# Patient Record
Sex: MALE | Race: WHITE | Employment: FULL TIME
[De-identification: names, ages, dates, MRNs, and addresses within clinical notes are randomized per-mention and may not be internally consistent; named-entity substitution may affect disease eponyms.]

---

## 2017-02-03 LAB
CHOLESTEROL, TOTAL: 220 MG/DL
CHOLESTEROL/HDL RATIO: 4.2
HDLC SERPL-MCNC: 52 MG/DL (ref 35–70)
LDL CHOLESTEROL CALCULATED: 156 MG/DL (ref 0–160)
TRIGL SERPL-MCNC: 59 MG/DL
VLDLC SERPL CALC-MCNC: 12 MG/DL

## 2017-02-22 ENCOUNTER — OFFICE VISIT (OUTPATIENT)
Dept: FAMILY MEDICINE CLINIC | Facility: CLINIC | Age: 42
End: 2017-02-22

## 2017-02-22 VITALS
SYSTOLIC BLOOD PRESSURE: 118 MMHG | WEIGHT: 183.8 LBS | HEIGHT: 68 IN | BODY MASS INDEX: 27.86 KG/M2 | HEART RATE: 72 BPM | DIASTOLIC BLOOD PRESSURE: 74 MMHG

## 2017-02-22 DIAGNOSIS — R07.9 CHEST PAIN, UNSPECIFIED TYPE: Primary | ICD-10-CM

## 2017-02-22 PROCEDURE — 93000 ELECTROCARDIOGRAM COMPLETE: CPT | Performed by: FAMILY MEDICINE

## 2017-02-22 PROCEDURE — 99213 OFFICE O/P EST LOW 20 MIN: CPT | Performed by: FAMILY MEDICINE

## 2017-02-22 ASSESSMENT — ENCOUNTER SYMPTOMS
BLOOD IN STOOL: 0
CONSTIPATION: 0
BACK PAIN: 0
ABDOMINAL PAIN: 0
WHEEZING: 0
SHORTNESS OF BREATH: 0
COUGH: 0
DIARRHEA: 0
CHEST TIGHTNESS: 1

## 2017-03-10 ENCOUNTER — OFFICE VISIT (OUTPATIENT)
Dept: FAMILY MEDICINE CLINIC | Facility: CLINIC | Age: 42
End: 2017-03-10

## 2017-03-10 VITALS
HEIGHT: 69 IN | OXYGEN SATURATION: 98 % | WEIGHT: 180 LBS | SYSTOLIC BLOOD PRESSURE: 122 MMHG | TEMPERATURE: 97.8 F | DIASTOLIC BLOOD PRESSURE: 70 MMHG | HEART RATE: 94 BPM | BODY MASS INDEX: 26.66 KG/M2

## 2017-03-10 DIAGNOSIS — J40 BRONCHITIS: Primary | ICD-10-CM

## 2017-03-10 PROCEDURE — 99213 OFFICE O/P EST LOW 20 MIN: CPT | Performed by: FAMILY MEDICINE

## 2017-03-10 RX ORDER — GUAIFENESIN AND CODEINE PHOSPHATE 100; 10 MG/5ML; MG/5ML
5 SOLUTION ORAL EVERY 4 HOURS PRN
Qty: 120 ML | Refills: 0 | Status: SHIPPED | OUTPATIENT
Start: 2017-03-10 | End: 2017-05-16 | Stop reason: ALTCHOICE

## 2017-03-10 ASSESSMENT — ENCOUNTER SYMPTOMS
SINUS PRESSURE: 1
SHORTNESS OF BREATH: 0
WHEEZING: 1
GASTROINTESTINAL NEGATIVE: 1
SORE THROAT: 1
COUGH: 1
RHINORRHEA: 1

## 2017-05-16 ENCOUNTER — OFFICE VISIT (OUTPATIENT)
Dept: FAMILY MEDICINE CLINIC | Age: 42
End: 2017-05-16
Payer: COMMERCIAL

## 2017-05-16 VITALS
BODY MASS INDEX: 27.79 KG/M2 | SYSTOLIC BLOOD PRESSURE: 120 MMHG | DIASTOLIC BLOOD PRESSURE: 70 MMHG | HEART RATE: 84 BPM | WEIGHT: 186 LBS

## 2017-05-16 DIAGNOSIS — J03.90 EXUDATIVE TONSILLITIS: Primary | ICD-10-CM

## 2017-05-16 PROCEDURE — 99213 OFFICE O/P EST LOW 20 MIN: CPT | Performed by: FAMILY MEDICINE

## 2017-05-16 RX ORDER — AZITHROMYCIN 250 MG/1
TABLET, FILM COATED ORAL
Qty: 1 PACKET | Refills: 0 | Status: SHIPPED | OUTPATIENT
Start: 2017-05-16 | End: 2018-05-18 | Stop reason: ALTCHOICE

## 2017-05-16 ASSESSMENT — ENCOUNTER SYMPTOMS
BACK PAIN: 0
SHORTNESS OF BREATH: 0
DIARRHEA: 0
BLOOD IN STOOL: 0
SORE THROAT: 1
WHEEZING: 0
CONSTIPATION: 0
COUGH: 0
ABDOMINAL PAIN: 0

## 2018-04-28 LAB
ALBUMIN SERPL-MCNC: NORMAL G/DL
ALP BLD-CCNC: NORMAL U/L
ALT SERPL-CCNC: NORMAL U/L
ANION GAP SERPL CALCULATED.3IONS-SCNC: NORMAL MMOL/L
AST SERPL-CCNC: NORMAL U/L
BASOPHILS ABSOLUTE: 50 /ΜL
BASOPHILS RELATIVE PERCENT: 1.1 %
BILIRUB SERPL-MCNC: NORMAL MG/DL (ref 0.1–1.4)
BUN BLDV-MCNC: NORMAL MG/DL
CALCIUM SERPL-MCNC: NORMAL MG/DL
CHLORIDE BLD-SCNC: NORMAL MMOL/L
CHOLESTEROL, TOTAL: 258 MG/DL
CHOLESTEROL/HDL RATIO: 4.6
CO2: NORMAL MMOL/L
CREAT SERPL-MCNC: NORMAL MG/DL
EOSINOPHILS ABSOLUTE: 180 /ΜL
EOSINOPHILS RELATIVE PERCENT: 4 %
GFR CALCULATED: NORMAL
GLUCOSE BLD-MCNC: NORMAL MG/DL
HCT VFR BLD CALC: 44.9 % (ref 41–53)
HDLC SERPL-MCNC: 56 MG/DL (ref 35–70)
HEMOGLOBIN: 15.7 G/DL (ref 13.5–17.5)
LDL CHOLESTEROL CALCULATED: 176 MG/DL (ref 0–160)
LYMPHOCYTES ABSOLUTE: 1827 /ΜL
LYMPHOCYTES RELATIVE PERCENT: 40.6 %
MCH RBC QN AUTO: 30.5 PG
MCHC RBC AUTO-ENTMCNC: 12.6 G/DL
MCV RBC AUTO: 87.2 FL
MONOCYTES ABSOLUTE: 387 /ΜL
MONOCYTES RELATIVE PERCENT: 8.6 %
NEUTROPHILS ABSOLUTE: 2057 /ΜL
NEUTROPHILS RELATIVE PERCENT: 45.7 %
PLATELET # BLD: 219 K/ΜL
PMV BLD AUTO: 10.8 FL
POTASSIUM SERPL-SCNC: NORMAL MMOL/L
RBC # BLD: 5.15 10^6/ΜL
SODIUM BLD-SCNC: NORMAL MMOL/L
TOTAL PROTEIN: NORMAL
TRIGL SERPL-MCNC: 129 MG/DL
VLDLC SERPL CALC-MCNC: ABNORMAL MG/DL
WBC # BLD: 4.5 10^3/ML

## 2018-05-18 ENCOUNTER — OFFICE VISIT (OUTPATIENT)
Dept: FAMILY MEDICINE CLINIC | Age: 43
End: 2018-05-18
Payer: COMMERCIAL

## 2018-05-18 VITALS
BODY MASS INDEX: 26.66 KG/M2 | SYSTOLIC BLOOD PRESSURE: 128 MMHG | HEIGHT: 69 IN | DIASTOLIC BLOOD PRESSURE: 62 MMHG | HEART RATE: 82 BPM | TEMPERATURE: 98 F | WEIGHT: 180 LBS

## 2018-05-18 DIAGNOSIS — J02.9 SORE THROAT: Primary | ICD-10-CM

## 2018-05-18 DIAGNOSIS — R05.9 COUGH: ICD-10-CM

## 2018-05-18 LAB — S PYO AG THROAT QL: NORMAL

## 2018-05-18 PROCEDURE — 87880 STREP A ASSAY W/OPTIC: CPT | Performed by: FAMILY MEDICINE

## 2018-05-18 PROCEDURE — 99213 OFFICE O/P EST LOW 20 MIN: CPT | Performed by: FAMILY MEDICINE

## 2018-05-18 ASSESSMENT — PATIENT HEALTH QUESTIONNAIRE - PHQ9
SUM OF ALL RESPONSES TO PHQ9 QUESTIONS 1 & 2: 0
SUM OF ALL RESPONSES TO PHQ QUESTIONS 1-9: 0
2. FEELING DOWN, DEPRESSED OR HOPELESS: 0
1. LITTLE INTEREST OR PLEASURE IN DOING THINGS: 0

## 2018-05-18 ASSESSMENT — ENCOUNTER SYMPTOMS
CONSTIPATION: 0
ABDOMINAL PAIN: 0
COUGH: 1
BACK PAIN: 0
SORE THROAT: 1
DIARRHEA: 0
SHORTNESS OF BREATH: 0
WHEEZING: 0
BLOOD IN STOOL: 0

## 2018-12-05 ENCOUNTER — TELEPHONE (OUTPATIENT)
Dept: FAMILY MEDICINE CLINIC | Age: 43
End: 2018-12-05

## 2018-12-05 DIAGNOSIS — E78.5 DYSLIPIDEMIA: Primary | ICD-10-CM

## 2018-12-11 ENCOUNTER — HOSPITAL ENCOUNTER (OUTPATIENT)
Age: 43
Setting detail: SPECIMEN
Discharge: HOME | End: 2018-12-11
Payer: COMMERCIAL

## 2018-12-11 DIAGNOSIS — E78.5 DYSLIPIDEMIA: ICD-10-CM

## 2018-12-11 LAB
ALBUMIN SERPL-MCNC: 4.8 G/DL (ref 3.5–5.2)
ALBUMIN/GLOBULIN RATIO: 1.8 (ref 1–2.5)
ALP BLD-CCNC: 67 U/L (ref 40–129)
ALT SERPL-CCNC: 17 U/L (ref 5–41)
ANION GAP SERPL CALCULATED.3IONS-SCNC: 14 MMOL/L (ref 9–17)
AST SERPL-CCNC: 20 U/L
BILIRUB SERPL-MCNC: 0.67 MG/DL (ref 0.3–1.2)
BUN BLDV-MCNC: 23 MG/DL (ref 6–20)
BUN/CREAT BLD: ABNORMAL (ref 9–20)
CALCIUM SERPL-MCNC: 9.8 MG/DL (ref 8.6–10.4)
CHLORIDE BLD-SCNC: 103 MMOL/L (ref 98–107)
CHOLESTEROL/HDL RATIO: 4.3
CHOLESTEROL: 256 MG/DL
CO2: 26 MMOL/L (ref 20–31)
CREAT SERPL-MCNC: 0.98 MG/DL (ref 0.7–1.2)
GFR AFRICAN AMERICAN: >60 ML/MIN
GFR NON-AFRICAN AMERICAN: >60 ML/MIN
GFR SERPL CREATININE-BSD FRML MDRD: ABNORMAL ML/MIN/{1.73_M2}
GFR SERPL CREATININE-BSD FRML MDRD: ABNORMAL ML/MIN/{1.73_M2}
GLUCOSE BLD-MCNC: 91 MG/DL (ref 70–99)
HCT VFR BLD CALC: 48.8 % (ref 40.7–50.3)
HDLC SERPL-MCNC: 60 MG/DL
HEMOGLOBIN: 15.9 G/DL (ref 13–17)
LDL CHOLESTEROL: 179 MG/DL (ref 0–130)
MCH RBC QN AUTO: 29.8 PG (ref 25.2–33.5)
MCHC RBC AUTO-ENTMCNC: 32.6 G/DL (ref 28.4–34.8)
MCV RBC AUTO: 91.6 FL (ref 82.6–102.9)
NRBC AUTOMATED: 0 PER 100 WBC
PDW BLD-RTO: 12.3 % (ref 11.8–14.4)
PLATELET # BLD: 256 K/UL (ref 138–453)
PMV BLD AUTO: 10.8 FL (ref 8.1–13.5)
POTASSIUM SERPL-SCNC: 4.2 MMOL/L (ref 3.7–5.3)
RBC # BLD: 5.33 M/UL (ref 4.21–5.77)
SODIUM BLD-SCNC: 143 MMOL/L (ref 135–144)
TOTAL PROTEIN: 7.5 G/DL (ref 6.4–8.3)
TRIGL SERPL-MCNC: 86 MG/DL
VLDLC SERPL CALC-MCNC: ABNORMAL MG/DL (ref 1–30)
WBC # BLD: 5.7 K/UL (ref 3.5–11.3)

## 2018-12-17 ENCOUNTER — OFFICE VISIT (OUTPATIENT)
Dept: FAMILY MEDICINE CLINIC | Age: 43
End: 2018-12-17
Payer: COMMERCIAL

## 2018-12-17 VITALS
WEIGHT: 182 LBS | HEART RATE: 70 BPM | SYSTOLIC BLOOD PRESSURE: 126 MMHG | BODY MASS INDEX: 26.96 KG/M2 | DIASTOLIC BLOOD PRESSURE: 80 MMHG | HEIGHT: 69 IN

## 2018-12-17 DIAGNOSIS — E78.5 DYSLIPIDEMIA: ICD-10-CM

## 2018-12-17 DIAGNOSIS — R19.5 HEME POSITIVE STOOL: ICD-10-CM

## 2018-12-17 DIAGNOSIS — Z00.00 PHYSICAL EXAM, ANNUAL: Primary | ICD-10-CM

## 2018-12-17 PROCEDURE — 99396 PREV VISIT EST AGE 40-64: CPT | Performed by: FAMILY MEDICINE

## 2018-12-17 ASSESSMENT — ENCOUNTER SYMPTOMS
CONSTIPATION: 0
COUGH: 0
ABDOMINAL PAIN: 0
BLOOD IN STOOL: 0
SHORTNESS OF BREATH: 0
ANAL BLEEDING: 1
WHEEZING: 0
BACK PAIN: 0
DIARRHEA: 0

## 2018-12-17 NOTE — PROGRESS NOTES
Allergen Reactions    Lodine [Etodolac]     Pcn [Penicillins]          Subjective:   Review of Systems   Constitutional: Negative for chills, diaphoresis, fatigue and fever. HENT: Negative for congestion and hearing loss. Eyes: Negative for visual disturbance. Respiratory: Negative for cough, shortness of breath and wheezing. Cardiovascular: Negative for chest pain, palpitations and leg swelling. Gastrointestinal: Positive for anal bleeding. Negative for abdominal pain, blood in stool, constipation and diarrhea. Genitourinary: Negative for dysuria. Musculoskeletal: Negative for arthralgias, back pain, gait problem and neck pain. Skin: Negative for rash. Neurological: Negative for weakness, numbness and headaches. Psychiatric/Behavioral: Negative for dysphoric mood and sleep disturbance. Objective:   /80   Pulse 70   Ht 5' 9\" (1.753 m)   Wt 182 lb (82.6 kg)   BMI 26.88 kg/m²     Physical Exam   Constitutional: He is oriented to person, place, and time. He appears well-developed and well-nourished. No distress. HENT:   Head: Normocephalic and atraumatic. Mouth/Throat: No oropharyngeal exudate. Eyes: Right eye exhibits no discharge. Left eye exhibits no discharge. No scleral icterus. Neck: Neck supple. Carotid bruit is not present. No thyromegaly present. Cardiovascular: Normal rate, regular rhythm and normal heart sounds. Exam reveals no gallop and no friction rub. No murmur heard. Pulmonary/Chest: Breath sounds normal. No respiratory distress. He has no wheezes. He has no rales. He exhibits no tenderness. Abdominal: There is no tenderness. Genitourinary: Prostate normal. Rectal exam shows anal tone abnormal (increased sphinctor tone.) and guaiac positive stool. Rectal exam shows no fissure. Prostate is not tender. Musculoskeletal: He exhibits no edema or tenderness. Lymphadenopathy:     He has no cervical adenopathy.    Neurological: He is alert and

## 2018-12-31 PROBLEM — R19.5 HEME POSITIVE STOOL: Status: ACTIVE | Noted: 2018-12-31

## 2019-01-02 ENCOUNTER — OFFICE VISIT (OUTPATIENT)
Dept: GASTROENTEROLOGY | Age: 44
End: 2019-01-02
Payer: COMMERCIAL

## 2019-01-02 VITALS
SYSTOLIC BLOOD PRESSURE: 139 MMHG | DIASTOLIC BLOOD PRESSURE: 82 MMHG | HEIGHT: 69 IN | BODY MASS INDEX: 27.61 KG/M2 | HEART RATE: 81 BPM | WEIGHT: 186.4 LBS

## 2019-01-02 DIAGNOSIS — R19.5 HEME POSITIVE STOOL: Primary | ICD-10-CM

## 2019-01-02 PROCEDURE — 99244 OFF/OP CNSLTJ NEW/EST MOD 40: CPT | Performed by: INTERNAL MEDICINE

## 2019-01-02 ASSESSMENT — ENCOUNTER SYMPTOMS
SINUS PAIN: 0
RECTAL PAIN: 0
BLOOD IN STOOL: 1
VOMITING: 0
WHEEZING: 0
BACK PAIN: 0
COLOR CHANGE: 0
DIARRHEA: 0
ANAL BLEEDING: 1
CONSTIPATION: 0
ABDOMINAL DISTENTION: 0
SINUS PRESSURE: 0
EYE PAIN: 0
ABDOMINAL PAIN: 0
TROUBLE SWALLOWING: 0
CHEST TIGHTNESS: 0
SHORTNESS OF BREATH: 0
NAUSEA: 0
EYE REDNESS: 0

## 2019-01-04 ENCOUNTER — HOSPITAL ENCOUNTER (OUTPATIENT)
Age: 44
Setting detail: OUTPATIENT SURGERY
Discharge: HOME | End: 2019-01-04
Attending: INTERNAL MEDICINE | Admitting: INTERNAL MEDICINE
Payer: COMMERCIAL

## 2019-01-04 VITALS
HEIGHT: 69 IN | SYSTOLIC BLOOD PRESSURE: 117 MMHG | WEIGHT: 180 LBS | RESPIRATION RATE: 16 BRPM | BODY MASS INDEX: 26.66 KG/M2 | DIASTOLIC BLOOD PRESSURE: 75 MMHG | HEART RATE: 70 BPM | OXYGEN SATURATION: 96 % | TEMPERATURE: 97.3 F

## 2019-01-04 PROCEDURE — 6360000002 HC RX W HCPCS: Performed by: INTERNAL MEDICINE

## 2019-01-04 PROCEDURE — 99152 MOD SED SAME PHYS/QHP 5/>YRS: CPT | Performed by: INTERNAL MEDICINE

## 2019-01-04 PROCEDURE — 2580000003 HC RX 258: Performed by: INTERNAL MEDICINE

## 2019-01-04 PROCEDURE — 7100000011 HC PHASE II RECOVERY - ADDTL 15 MIN: Performed by: INTERNAL MEDICINE

## 2019-01-04 PROCEDURE — 3609010600 HC COLONOSCOPY POLYPECTOMY SNARE/COLD BIOPSY: Performed by: INTERNAL MEDICINE

## 2019-01-04 PROCEDURE — 2709999900 HC NON-CHARGEABLE SUPPLY: Performed by: INTERNAL MEDICINE

## 2019-01-04 PROCEDURE — 99153 MOD SED SAME PHYS/QHP EA: CPT | Performed by: INTERNAL MEDICINE

## 2019-01-04 PROCEDURE — 45380 COLONOSCOPY AND BIOPSY: CPT | Performed by: INTERNAL MEDICINE

## 2019-01-04 PROCEDURE — 88305 TISSUE EXAM BY PATHOLOGIST: CPT

## 2019-01-04 PROCEDURE — 7100000010 HC PHASE II RECOVERY - FIRST 15 MIN: Performed by: INTERNAL MEDICINE

## 2019-01-04 RX ORDER — FENTANYL CITRATE 50 UG/ML
INJECTION, SOLUTION INTRAMUSCULAR; INTRAVENOUS PRN
Status: DISCONTINUED | OUTPATIENT
Start: 2019-01-04 | End: 2019-01-04 | Stop reason: HOSPADM

## 2019-01-04 RX ORDER — MIDAZOLAM HYDROCHLORIDE 1 MG/ML
INJECTION INTRAMUSCULAR; INTRAVENOUS PRN
Status: DISCONTINUED | OUTPATIENT
Start: 2019-01-04 | End: 2019-01-04 | Stop reason: HOSPADM

## 2019-01-04 RX ORDER — SODIUM CHLORIDE 9 MG/ML
INJECTION, SOLUTION INTRAVENOUS CONTINUOUS
Status: DISCONTINUED | OUTPATIENT
Start: 2019-01-04 | End: 2019-01-04 | Stop reason: HOSPADM

## 2019-01-04 RX ADMIN — SODIUM CHLORIDE: 9 INJECTION, SOLUTION INTRAVENOUS at 06:59

## 2019-01-04 ASSESSMENT — PAIN SCALES - GENERAL: PAINLEVEL_OUTOF10: 0

## 2019-01-04 ASSESSMENT — PAIN - FUNCTIONAL ASSESSMENT: PAIN_FUNCTIONAL_ASSESSMENT: 0-10

## 2019-01-07 LAB — SURGICAL PATHOLOGY REPORT: NORMAL

## 2019-01-09 ENCOUNTER — TELEPHONE (OUTPATIENT)
Dept: GASTROENTEROLOGY | Age: 44
End: 2019-01-09

## 2019-01-09 DIAGNOSIS — R19.5 HEME POSITIVE STOOL: ICD-10-CM

## 2019-05-13 ENCOUNTER — OFFICE VISIT (OUTPATIENT)
Dept: FAMILY MEDICINE CLINIC | Age: 44
End: 2019-05-13
Payer: COMMERCIAL

## 2019-05-13 VITALS
HEART RATE: 70 BPM | SYSTOLIC BLOOD PRESSURE: 128 MMHG | OXYGEN SATURATION: 99 % | DIASTOLIC BLOOD PRESSURE: 72 MMHG | BODY MASS INDEX: 26.58 KG/M2 | WEIGHT: 180 LBS

## 2019-05-13 DIAGNOSIS — R07.9 CHEST PAIN, UNSPECIFIED TYPE: Primary | ICD-10-CM

## 2019-05-13 PROCEDURE — 93000 ELECTROCARDIOGRAM COMPLETE: CPT | Performed by: NURSE PRACTITIONER

## 2019-05-13 PROCEDURE — 99214 OFFICE O/P EST MOD 30 MIN: CPT | Performed by: NURSE PRACTITIONER

## 2019-05-13 ASSESSMENT — PATIENT HEALTH QUESTIONNAIRE - PHQ9
1. LITTLE INTEREST OR PLEASURE IN DOING THINGS: 0
SUM OF ALL RESPONSES TO PHQ9 QUESTIONS 1 & 2: 0
SUM OF ALL RESPONSES TO PHQ QUESTIONS 1-9: 0
SUM OF ALL RESPONSES TO PHQ QUESTIONS 1-9: 0
2. FEELING DOWN, DEPRESSED OR HOPELESS: 0

## 2019-05-13 NOTE — PROGRESS NOTES
Visit Information    Have you changed or started any medications since your last visit including any over-the-counter medicines, vitamins, or herbal medicines? no   Are you having any side effects from any of your medications? -  no  Have you stopped taking any of your medications? Is so, why? -  no    Have you seen any other physician or provider since your last visit? No  Have you had any other diagnostic tests since your last visit? No  Have you been seen in the emergency room and/or had an admission to a hospital since we last saw you? No  Have you had your routine dental cleaning in the past 6 months? yes -     Have you activated your [employer] account? If not, what are your barriers?  No:      Patient Care Team:  Kilo Alanis MD as PCP - General (Family Medicine)  Lauren Dave MD as Surgeon (General Surgery)  Viet Andrews MD as Consulting Physician (Gastroenterology)    Medical History Review  Past Medical, Family, and Social History reviewed and does not contribute to the patient presenting condition    Health Maintenance   Topic Date Due    HIV screen  04/30/1992    DTaP/Tdap/Td vaccine (1 - Tdap) 04/30/1996    Lipid screen  12/11/2023    Flu vaccine  Completed    Pneumococcal 0-64 years Vaccine  Aged Out

## 2019-05-13 NOTE — PROGRESS NOTES
Cole Parents, FNP-C  [address]  [id number] [facility]  [phone]  [phone]    Josi Jane is a 45 y.o. male who is here with c/o of:    Chief Complaint: Chest Pain (started a couple weeks ago )      Patient Accompanied by: patient    HPI - Josi Jane is here today with c/o:    Patient reports a 2 week hx of chest pain. He reports sudden onset of sharp pain to the left chest that woke him from sleep. He reports the pain lasted about 1 minute. He reports it would fade away and then come back. The pain decreased with position change. He also reports having similar pain in the right chest. He has seen Dr. Hoda Gallardo for this in the past. He has an EKG at work every year and they are all normal. He has had a stress test 10-15 years ago for this problem. Currently he reports mild (1-2) crampy heavy feeling to the left chest. He denies heartburn or reflux. Patient Active Problem List:     Dyslipidemia     Heme positive stool     Past Medical History:   Diagnosis Date    Hyperlipidemia     elevated       Past Surgical History:   Procedure Laterality Date    ADENOIDECTOMY      COLONOSCOPY N/A 1/4/2019    COLONOSCOPY POLYPECTOMY SNARE/COLD BIOPSY performed by Maribel Morataya MD at [address] [place] [address] ([facility]) Bilateral 3/2013    LASIK       No family history on file. Social History     Tobacco Use    Smoking status: Never Smoker    Smokeless tobacco: Never Used   Substance Use Topics    Alcohol use: Yes     Comment: weekly     ALLERGIES:    Allergies   Allergen Reactions    Lodine [Etodolac]     Pcn [Penicillins]           Subjective     · Constitutional:  Negative for activity change, appetite change,unexpected weight change, chills, fever, and fatigue. · HENT: Negative for ear pain, sore throat,  Rhinorrhea, sinus pain, sinus pressure, congestion. · Eyes:  Negative for pain and discharge.    · Respiratory:  Negative for shortness of breath, wheezing, and cough. Positive for chest pain  · Cardiovascular:  Negative for chest pain, palpitations and leg swelling. · Gastrointestinal: Negative for abdominal pain, blood in stool, constipation,diarrhea, nausea and vomiting. · Endocrine: Negative for cold intolerance, heat intolerance, polydipsia, polyphagia and polyuria. · Genitourinary: Negative for difficulty urinating, dysuria, flank pain, frequency, hematuria and urgency. · Musculoskeletal: Negative for arthralgias, back pain, joint swelling, myalgias, neck pain and neck stiffness. · Skin: Negative for rash and wound. · Allergic/Immunologic: Negative for environmental allergies and food allergies. · Neurological:  Negative for dizziness, light-headedness, numbness and headaches. · Hematological:  Negative for adenopathy. Does not bruise/bleed easily. · Psychiatric/Behavioral: Negative for self-injury, sleep disturbance and suicidal ideas. Objective     PHYSICAL EXAM:   · Constitutional: Maybelle Koyanagi is oriented to person, place, and time. Vital signs are normal. Appears well-developed and well-nourished. · HEENT:   · Head: Normocephalic and atraumatic. Right Ear: Hearing and external ear normal.     · Left Ear: Hearing and external ear normal.    · Nose: Nares normal. Septum midline. No drainage or sinus tenderness. Mucosa pink and moist  · Mouth/Throat: Oropharynx- No erythema, no exudate. Uvula midline, no erythema, no edema. Mucous membranes are pink and moist.   · Eyes:PERRL, EOMI, Conjunctiva normal, No discharge. · Neck: Full passive range of motion. Non-tender on palpation. Neck supple. No thyromegaly present. Trachea normal.  · Cardiovascular: Normal rate, regular rhythm, S1, S2, no murmur, no gallop, no friction rub, intact distal pulses. · Pulmonary/Chest: Breath sounds are clear throughout, No respiratory distress, No wheezing, No chest tenderness. Effort normal  · Abdominal: Soft.  Normal appearance, bowel sounds are present and normoactive. There is no hepatosplenomegaly. There is no tenderness. There is no CVA tenderness. · Musculoskeletal: Extremities appear regular and symmetric. No evident masses, lesions, foreign bodies, or other abnormalities. No edema. No tenderness on palpation. Joints are stable. Full ROM, strength and tone are within normal limits. · Lymphadenopathy: No lymphadenopathy noted. · Neurological: Alert and oriented to person, place, and time. Normal motor function, Normal sensory function, No focal deficits noted. He has normal strength. · Skin: Skin is warm, dry and intact. No obvious lesions on exposed skin  · Psychiatric: Normal mood and affect. Speech is normal and behavior is normal.       Nursing note and vitals reviewed. Blood pressure 128/72, pulse 70, weight 180 lb (81.6 kg), SpO2 99 %. Body mass index is 26.58 kg/m². Wt Readings from Last 3 Encounters:   05/13/19 180 lb (81.6 kg)   01/04/19 180 lb (81.6 kg)   01/02/19 186 lb 6.4 oz (84.6 kg)     BP Readings from Last 3 Encounters:   05/13/19 128/72   01/04/19 117/75   01/02/19 139/82       No results found for this visit on 05/13/19. Completed Orders/Prescriptions   No orders of the defined types were placed in this encounter. AssessmentPlan/Medical Decision Making     1. Chest pain, unspecified type  - symptoms strongly suggestive of musculoskeletal pain  - this is a chronic condition - I have reviewed red flag symptoms to seek immediate medical attention  - we did discuss the possibility of reflux. However, symptoms are not consistent with this diagnosis. - patient declined blood work or medication at this time  - MN ELECTROCARDIOGRAM, COMPLETE      Return if symptoms worsen or fail to improve, for chest pain. 1.  Kenan received counseling on the following healthy behaviors: nutrition, exercise and medication adherence  2. Patient given educational materials - see patient instructions  3.   Was a self-tracking handout given in paper form or via [employer]? No  If yes, see orders or list here. 4.  Discussed use, benefit, and side effects of prescribed medications. Barriers to medication compliance addressed. All patient questions answered. Pt voiced understanding. 5.  Reviewed prior labs, imaging, consultation, follow up, and health maintenance  6. Continue current medications, diet and exercise. 7. Discussed use, benefit, and side effects of prescribed medications. Barriers to medication compliance addressed. All her questions were answered. Pt voiced understanding. Sandra Brush will continue current medications, diet and exercise. Patient given educational materials on red flag symptoms - chest pain    Of the 25 minute duration appointment visit, Yolanda Broussard CNP spent at least 50% of the face-to-face time in counseling, explanation of diagnosis, planning of further management, and answering all questions. Signed:  Yolanda Broussard CNP    This note is created with the assistance of a speech-recognition program.  While intending to generate a document that actually reflects the content of the visit, no guarantees can be provided that every mistake has been identified and corrected by editing.

## 2019-11-08 ENCOUNTER — OFFICE VISIT (OUTPATIENT)
Dept: FAMILY MEDICINE CLINIC | Age: 44
End: 2019-11-08
Payer: COMMERCIAL

## 2019-11-08 VITALS
DIASTOLIC BLOOD PRESSURE: 74 MMHG | SYSTOLIC BLOOD PRESSURE: 102 MMHG | HEART RATE: 88 BPM | WEIGHT: 176.3 LBS | BODY MASS INDEX: 26.03 KG/M2 | OXYGEN SATURATION: 98 %

## 2019-11-08 DIAGNOSIS — E78.5 DYSLIPIDEMIA: ICD-10-CM

## 2019-11-08 DIAGNOSIS — M75.81 TENDINITIS OF RIGHT ROTATOR CUFF: Primary | ICD-10-CM

## 2019-11-08 PROCEDURE — 99213 OFFICE O/P EST LOW 20 MIN: CPT | Performed by: FAMILY MEDICINE

## 2019-11-08 ASSESSMENT — ENCOUNTER SYMPTOMS
WHEEZING: 0
COUGH: 0
ABDOMINAL PAIN: 0
BLOOD IN STOOL: 0
CONSTIPATION: 0
SHORTNESS OF BREATH: 0
BACK PAIN: 0
DIARRHEA: 0

## 2019-11-20 ENCOUNTER — TELEPHONE (OUTPATIENT)
Dept: FAMILY MEDICINE CLINIC | Age: 44
End: 2019-11-20

## 2019-11-21 DIAGNOSIS — M25.511 RIGHT SHOULDER PAIN, UNSPECIFIED CHRONICITY: Primary | ICD-10-CM

## 2019-12-02 ENCOUNTER — TELEPHONE (OUTPATIENT)
Dept: FAMILY MEDICINE CLINIC | Age: 44
End: 2019-12-02

## 2019-12-04 ENCOUNTER — TELEPHONE (OUTPATIENT)
Dept: FAMILY MEDICINE CLINIC | Age: 44
End: 2019-12-04

## 2019-12-06 ENCOUNTER — OFFICE VISIT (OUTPATIENT)
Dept: FAMILY MEDICINE CLINIC | Age: 44
End: 2019-12-06
Payer: COMMERCIAL

## 2019-12-06 VITALS
DIASTOLIC BLOOD PRESSURE: 80 MMHG | OXYGEN SATURATION: 97 % | WEIGHT: 180 LBS | SYSTOLIC BLOOD PRESSURE: 116 MMHG | HEART RATE: 83 BPM | BODY MASS INDEX: 26.58 KG/M2

## 2019-12-06 DIAGNOSIS — M75.101 TEAR OF RIGHT ROTATOR CUFF, UNSPECIFIED TEAR EXTENT, UNSPECIFIED WHETHER TRAUMATIC: Primary | ICD-10-CM

## 2019-12-06 PROCEDURE — 99213 OFFICE O/P EST LOW 20 MIN: CPT | Performed by: FAMILY MEDICINE

## 2019-12-06 PROCEDURE — 90471 IMMUNIZATION ADMIN: CPT | Performed by: FAMILY MEDICINE

## 2019-12-06 PROCEDURE — 90688 IIV4 VACCINE SPLT 0.5 ML IM: CPT | Performed by: FAMILY MEDICINE

## 2019-12-06 ASSESSMENT — ENCOUNTER SYMPTOMS
BACK PAIN: 0
ABDOMINAL PAIN: 0
CONSTIPATION: 0
DIARRHEA: 0
BLOOD IN STOOL: 0
COUGH: 0
WHEEZING: 0
SHORTNESS OF BREATH: 0

## 2019-12-11 ENCOUNTER — HOSPITAL ENCOUNTER (OUTPATIENT)
Dept: MRI IMAGING | Facility: CLINIC | Age: 44
Discharge: HOME | End: 2019-12-13
Payer: COMMERCIAL

## 2019-12-11 DIAGNOSIS — M25.511 ACUTE PAIN OF RIGHT SHOULDER: Primary | ICD-10-CM

## 2019-12-11 DIAGNOSIS — M75.101 TEAR OF RIGHT ROTATOR CUFF, UNSPECIFIED TEAR EXTENT, UNSPECIFIED WHETHER TRAUMATIC: ICD-10-CM

## 2019-12-11 PROCEDURE — 73221 MRI JOINT UPR EXTREM W/O DYE: CPT

## 2019-12-13 ENCOUNTER — OFFICE VISIT (OUTPATIENT)
Dept: ORTHOPEDIC SURGERY | Age: 44
End: 2019-12-13
Payer: COMMERCIAL

## 2019-12-13 VITALS — BODY MASS INDEX: 27.28 KG/M2 | WEIGHT: 180 LBS | HEIGHT: 68 IN

## 2019-12-13 DIAGNOSIS — M75.81 TENDINITIS OF RIGHT ROTATOR CUFF: Primary | ICD-10-CM

## 2019-12-13 PROCEDURE — 99203 OFFICE O/P NEW LOW 30 MIN: CPT | Performed by: ORTHOPAEDIC SURGERY

## 2019-12-13 RX ORDER — DICLOFENAC SODIUM 75 MG/1
75 TABLET, DELAYED RELEASE ORAL 2 TIMES DAILY WITH MEALS
Qty: 28 TABLET | Refills: 1 | Status: SHIPPED | OUTPATIENT
Start: 2019-12-13 | End: 2021-06-25

## 2019-12-23 ENCOUNTER — HOSPITAL ENCOUNTER (OUTPATIENT)
Dept: PHYSICAL THERAPY | Facility: CLINIC | Age: 44
Setting detail: THERAPIES SERIES
Discharge: HOME | End: 2019-12-23
Payer: COMMERCIAL

## 2019-12-23 PROCEDURE — 97161 PT EVAL LOW COMPLEX 20 MIN: CPT

## 2019-12-23 PROCEDURE — 97110 THERAPEUTIC EXERCISES: CPT

## 2019-12-30 ENCOUNTER — HOSPITAL ENCOUNTER (OUTPATIENT)
Dept: PHYSICAL THERAPY | Facility: CLINIC | Age: 44
Setting detail: THERAPIES SERIES
Discharge: HOME | End: 2019-12-30
Payer: COMMERCIAL

## 2019-12-30 PROCEDURE — 97110 THERAPEUTIC EXERCISES: CPT

## 2020-06-16 ENCOUNTER — TELEPHONE (OUTPATIENT)
Dept: ORTHOPEDIC SURGERY | Age: 45
End: 2020-06-16

## 2020-06-16 RX ORDER — DICLOFENAC SODIUM 75 MG/1
75 TABLET, DELAYED RELEASE ORAL 2 TIMES DAILY WITH MEALS
Qty: 28 TABLET | Refills: 1 | Status: CANCELLED | OUTPATIENT
Start: 2020-06-16 | End: 2020-06-30

## 2020-06-17 ENCOUNTER — TELEPHONE (OUTPATIENT)
Dept: ORTHOPEDIC SURGERY | Age: 45
End: 2020-06-17

## 2020-06-23 ENCOUNTER — OFFICE VISIT (OUTPATIENT)
Dept: ORTHOPEDIC SURGERY | Age: 45
End: 2020-06-23
Payer: COMMERCIAL

## 2020-06-23 VITALS — WEIGHT: 175 LBS | TEMPERATURE: 97.5 F | BODY MASS INDEX: 25.92 KG/M2 | HEIGHT: 69 IN

## 2020-06-23 PROCEDURE — 99213 OFFICE O/P EST LOW 20 MIN: CPT | Performed by: ORTHOPAEDIC SURGERY

## 2020-06-23 RX ORDER — DICLOFENAC SODIUM 75 MG/1
75 TABLET, DELAYED RELEASE ORAL 2 TIMES DAILY WITH MEALS
Qty: 28 TABLET | Refills: 0 | Status: SHIPPED | OUTPATIENT
Start: 2020-06-23 | End: 2021-06-25

## 2020-06-23 NOTE — PROGRESS NOTES
Take 1 tablet by mouth 2 times daily (with meals) for 14 days 28 tablet 0    multivitamin (THERAGRAN) per tablet Take 1 tablet by mouth daily.  diclofenac (VOLTAREN) 75 MG EC tablet Take 1 tablet by mouth 2 times daily (with meals) for 14 days 28 tablet 1     No current facility-administered medications for this visit. Allergies  Allergies have been reviewed. Omaira Alba is allergic to lodine [etodolac] and pcn [penicillins]. Social History  Omaira Alba  reports that he has never smoked. He has never used smokeless tobacco. He reports current alcohol use. He reports that he does not use drugs. Family History  Keann's family history is not on file.      Physical Exam:     Temp 97.5 °F (36.4 °C) (Infrared)   Ht 5' 9\" (1.753 m)   Wt 175 lb (79.4 kg)   BMI 25.84 kg/m²    General Appearance: alert, well appearing, and in no distress  Mental Status: alert, oriented to person, place, and time  Gait: normal    Shoulder:    Skin: warm and dry, no rash or erythema; no swelling or obvious muscular atrophy  Vasculature: 2+ radial pulses bilaterally  Neuro: Sensation grossly intact to light touch diffusely  Tenderness: Tender to palpation along the biceps tendon in the bicipital groove of the left shoulder    ROM: (Degrees)    Right   A P   Left   A P    Elevation  140    Elevation  140 140  Abduction  150    Abduction  145  145  ER   80    ER   80 80  IR   T12    IR   T12   90 abd/ER      90 abd/ER     90 abd/IR      90 abd/IR     Crepitation  No    Crepitation No  Dyskenesia  No    Dyskenesia No      Muscle strength:    Right       Left    Deltoid   5    Deltoid   5  Supraspinatus  5    Supraspinatus  5  ER   5    ER   5  IR   5    IR   5    Special tests    Right   Rotator Cuff    Left    n   Painful arc    n   n   Pain with ER    n    n   Neer's     n    n   Hawkin's    y    n   Drop Arm    n  n   Lift off/Belly Press   n  n   ER Lag    n          AC Joint  n   AC tenderness   n  n   Cross-chest

## 2020-07-14 ENCOUNTER — HOSPITAL ENCOUNTER (OUTPATIENT)
Dept: PHYSICAL THERAPY | Facility: CLINIC | Age: 45
Setting detail: THERAPIES SERIES
Discharge: HOME | End: 2020-07-14
Payer: COMMERCIAL

## 2020-07-14 PROCEDURE — 97140 MANUAL THERAPY 1/> REGIONS: CPT

## 2020-07-14 PROCEDURE — 97161 PT EVAL LOW COMPLEX 20 MIN: CPT

## 2020-07-14 PROCEDURE — 97110 THERAPEUTIC EXERCISES: CPT

## 2020-07-14 NOTE — CONSULTS
6/23/2020 were independently reviewed  Indications: Left shoulder pain  Findings:  Normal glenohumeral and acromioclavicular joint spaces with a type 2 acromion. Impression: Normal left shoulder radiograph      Medications: [x] Refer to full medical record [] None [] Other:  Allergies:      [x] Refer to full medical record [] None [] Other:    Function:  Hand Dominance  [x] Right  [] Left  Working:  [] Normal Duty  [] Light Duty  [] Off D/T Condition  [] Retired    [] Not Employed    []  Disability  [] Other:             Return to work:   Job/ADL Description: [employer] [id number]    Pain:  [x] Yes  [] No Location: L shoulder Pain Rating: (0-10 scale) Up to 5/10, 1-2/10 on average  Pain altered Tx:  [] Yes  [x] No  Action:  Symptoms:  [] Improving [] Worsening [x] Same  Better: Resting  Worse: Utilizing arm              Sleep: [x] OK    [] Disturbed    Objective:     ROM  °A/P END FEEL STRENGTH TESTS (+/-) Left Right Not Tested    Left Right  Left Right RTC   []   Sit Shld Flex      Full can  -  []   Sit Shld Abd      Patte/ Hornblower -  []   Sit Shld IR      Painful Arc -  []   Shoulder Flex    5  Drop Arm   []   Ext    5        ABD    4 pain  LABRUM       ER @ 90 86 P   4  O'Briens +     IR 55 P   5  Speeds +     Supraspinatus      Bicep Load -     Infraspinatus/ Teres Minor      Pronated Bicep Load -     Serratus Ant      Resisted ER with supination      Pectoralis      IMPINGE      Harriet Gonzales +     Mid Trap      Neer -     Lower Trap      Ponce -     Elbow Flex. INSTABIL      Elbow Ext.       Apprehension            Scapular Assistance Test (SAT)      Functional ER            Functional IR      BICEP            Yergason            Speeds          OBSERVATION No Deficit Deficit Not Tested Comments   Forward Head [] [x] []    Rounded Shoulders [] [x] []    Kyphosis [x] [] []    Scap Height/Position [] [x] [] Slightly anterior tilted L scapula   Winging [x] [] []    SH Rhythm [x] [] [] PALPATION       SC/AC Joint [x] [] []    Supraspinatus [] [x] [] Slight TTP at insertion   Biceps tendon/groove [] [x] [] Slight TTP at proximal bicipital groove   Posterior shld [x] [] []    NEUROLOGICAL       Cervical ROM/Quadrant [x] [] []    Compression/Distraction [x] [] []    Sensation [] [] []      Tight Pectoralis Minor on L    GLENOHUMERAL PASSIVE JOINT MOBILITY:  Anterior Capsule: Grade III WFL all planes  Posterior Capsule:  Inferior Capsule:    UEFS: 68/80    Comments:    Assessment: Patient exhibits signs/symptoms consistent with RTC syndrome secondarily to extreme tightness in pectoralis minor creating anteriorly tilted scapula on L. Patient should benefit from consistent and skilled physical therapy services to address deficits secondary to referred pathology. Problems:    [x] ? Pain:  [x] ? Strength:  [] Other:    STG: (to be met in 4 treatments)  1. ? Pain: Patient will report decreases in pain to <4/10 with activity within 4 weeks for improved function and quality of life. 2. ? Strength: Patient will demonstrate improvements in R shoulder/scapular strength to 4/5 for improved scapulohumeral rhythm  3. Independent with Home Exercise Programs    LTG: (to be met in 8 treatments)  1. Patient will demonstrate >74/80 on UEFS for improved function and ADL tolerance  2. Patient will demonstrate improved R shoulder/scapular strength to 5/5 for improved scapulohumeral rhythm and decreased pain with activity. 3. Patient to demonstrate pectoralis minor length WFL on L for improved scapular anatomic position to decrease impingement created.                    Patient goals: Decrease pain and improve mobility and strength      Rehab Potential:  [] Good  [x] Fair  [] Poor   Suggested Professional Referral:  [x] No  [] Yes:  Barriers to Goal Achievement[de-identified]  [x] No  [] Yes:  Domestic Concerns:  [x] No  [] Yes:    Pt. Education:  [x] Plans/Goals, Risks/Benefits discussed  [x] Home exercise program  Method of []  Aquatics     []  Vasocompression     []  Other       TOTAL TREATMENT TIME: 47     Time in: 11:55am    Time Out: 12:45pm    Electronically signed by: Deven Zayas PT        Physician Signature:________________________________Date:__________________  By signing above or cosigning this note, I have reviewed this plan of care and certify a need for medically necessary rehabilitation services.      *PLEASE SIGN ABOVE AND FAX BACK ALL PAGES*

## 2020-09-23 ENCOUNTER — OFFICE VISIT (OUTPATIENT)
Dept: FAMILY MEDICINE CLINIC | Age: 45
End: 2020-09-23
Payer: COMMERCIAL

## 2020-09-23 VITALS
TEMPERATURE: 97.7 F | HEART RATE: 73 BPM | BODY MASS INDEX: 26.87 KG/M2 | HEIGHT: 69 IN | OXYGEN SATURATION: 98 % | SYSTOLIC BLOOD PRESSURE: 118 MMHG | DIASTOLIC BLOOD PRESSURE: 80 MMHG | WEIGHT: 181.4 LBS

## 2020-09-23 PROCEDURE — 99396 PREV VISIT EST AGE 40-64: CPT | Performed by: FAMILY MEDICINE

## 2020-09-23 PROCEDURE — 90471 IMMUNIZATION ADMIN: CPT | Performed by: FAMILY MEDICINE

## 2020-09-23 PROCEDURE — 90686 IIV4 VACC NO PRSV 0.5 ML IM: CPT | Performed by: FAMILY MEDICINE

## 2020-09-23 SDOH — ECONOMIC STABILITY: FOOD INSECURITY: WITHIN THE PAST 12 MONTHS, YOU WORRIED THAT YOUR FOOD WOULD RUN OUT BEFORE YOU GOT MONEY TO BUY MORE.: NEVER TRUE

## 2020-09-23 SDOH — ECONOMIC STABILITY: TRANSPORTATION INSECURITY
IN THE PAST 12 MONTHS, HAS LACK OF TRANSPORTATION KEPT YOU FROM MEETINGS, WORK, OR FROM GETTING THINGS NEEDED FOR DAILY LIVING?: NO

## 2020-09-23 SDOH — ECONOMIC STABILITY: TRANSPORTATION INSECURITY
IN THE PAST 12 MONTHS, HAS THE LACK OF TRANSPORTATION KEPT YOU FROM MEDICAL APPOINTMENTS OR FROM GETTING MEDICATIONS?: NO

## 2020-09-23 SDOH — ECONOMIC STABILITY: FOOD INSECURITY: WITHIN THE PAST 12 MONTHS, THE FOOD YOU BOUGHT JUST DIDN'T LAST AND YOU DIDN'T HAVE MONEY TO GET MORE.: NEVER TRUE

## 2020-09-23 SDOH — ECONOMIC STABILITY: INCOME INSECURITY: HOW HARD IS IT FOR YOU TO PAY FOR THE VERY BASICS LIKE FOOD, HOUSING, MEDICAL CARE, AND HEATING?: NOT HARD AT ALL

## 2020-09-23 ASSESSMENT — ENCOUNTER SYMPTOMS
SHORTNESS OF BREATH: 0
BLOOD IN STOOL: 0
CONSTIPATION: 0
COUGH: 0
ABDOMINAL PAIN: 0
DIARRHEA: 0
ABDOMINAL DISTENTION: 1
BACK PAIN: 0
WHEEZING: 0

## 2020-09-23 ASSESSMENT — PATIENT HEALTH QUESTIONNAIRE - PHQ9
2. FEELING DOWN, DEPRESSED OR HOPELESS: 0
1. LITTLE INTEREST OR PLEASURE IN DOING THINGS: 0
SUM OF ALL RESPONSES TO PHQ QUESTIONS 1-9: 0
SUM OF ALL RESPONSES TO PHQ QUESTIONS 1-9: 0
SUM OF ALL RESPONSES TO PHQ9 QUESTIONS 1 & 2: 0

## 2020-09-23 NOTE — PROGRESS NOTES
for rash. Neurological: Negative for weakness, numbness and headaches. Psychiatric/Behavioral: Negative for dysphoric mood and sleep disturbance.       :   /80   Pulse 73   Temp 97.7 °F (36.5 °C)   Ht 5' 9\" (1.753 m)   Wt 181 lb 6.4 oz (82.3 kg)   SpO2 98%   BMI 26.79 kg/m²     Physical Exam  Constitutional:       General: He is not in acute distress. Appearance: He is well-developed. He is not diaphoretic. HENT:      Head: Normocephalic and atraumatic. Right Ear: Tympanic membrane normal. There is no impacted cerumen. Left Ear: Tympanic membrane normal. There is no impacted cerumen. Mouth/Throat:      Pharynx: No oropharyngeal exudate. Eyes:      General: No scleral icterus. Right eye: No discharge. Left eye: No discharge. Neck:      Musculoskeletal: Neck supple. No neck rigidity or muscular tenderness. Thyroid: No thyromegaly. Vascular: No carotid bruit. Cardiovascular:      Rate and Rhythm: Normal rate and regular rhythm. Heart sounds: Normal heart sounds. No murmur. No friction rub. No gallop. Pulmonary:      Effort: No respiratory distress. Breath sounds: Normal breath sounds. No wheezing or rales. Chest:      Chest wall: No tenderness. Abdominal:      General: Bowel sounds are normal.      Palpations: There is no mass. Tenderness: There is no abdominal tenderness. There is no guarding or rebound. Musculoskeletal:         General: No tenderness. Right lower leg: No edema. Left lower leg: No edema. Lymphadenopathy:      Cervical: No cervical adenopathy. Skin:     Findings: No bruising or rash. Neurological:      Mental Status: He is alert and oriented to person, place, and time. Cranial Nerves: No cranial nerve deficit. Coordination: Coordination normal.   Psychiatric:         Behavior: Behavior normal.         Thought Content:  Thought content normal.         Judgment: Judgment normal. Assessment:       Diagnosis Orders   1. Physical exam, annual     2. Dyslipidemia  CBC Auto Differential    Comprehensive Metabolic Panel    Lipid Panel         Plan:      Return if symptoms worsen or fail to improve. Orders Placed This Encounter   Procedures    INFLUENZA, QUADV, 3 YRS AND OLDER, IM PF, PREFILL SYR OR SDV, 0.5ML (AFLURIA QUADV, PF)    CBC Auto Differential     Standing Status:   Future     Standing Expiration Date:   9/23/2021    Comprehensive Metabolic Panel     Standing Status:   Future     Standing Expiration Date:   9/23/2021    Lipid Panel     Standing Status:   Future     Standing Expiration Date:   9/23/2021     Order Specific Question:   Is Patient Fasting?/# of Hours     Answer:   12 hour fast     No orders of the defined types were placed in this encounter. Flu vaccine. Labs fasting and 10 year risk assessment. At this time he does not wish medication. Diet and exercise discussed.

## 2020-09-25 ENCOUNTER — HOSPITAL ENCOUNTER (OUTPATIENT)
Age: 45
Setting detail: SPECIMEN
Discharge: HOME | End: 2020-09-25
Payer: COMMERCIAL

## 2020-09-25 LAB
ABSOLUTE EOS #: 0.22 K/UL (ref 0–0.44)
ABSOLUTE IMMATURE GRANULOCYTE: <0.03 K/UL (ref 0–0.3)
ABSOLUTE LYMPH #: 1.96 K/UL (ref 1.1–3.7)
ABSOLUTE MONO #: 0.52 K/UL (ref 0.1–1.2)
ALBUMIN SERPL-MCNC: 4.3 G/DL (ref 3.5–5.2)
ALBUMIN/GLOBULIN RATIO: 1.8 (ref 1–2.5)
ALP BLD-CCNC: 59 U/L (ref 40–129)
ALT SERPL-CCNC: 20 U/L (ref 5–41)
ANION GAP SERPL CALCULATED.3IONS-SCNC: 12 MMOL/L (ref 9–17)
AST SERPL-CCNC: 22 U/L
BASOPHILS # BLD: 1 % (ref 0–2)
BASOPHILS ABSOLUTE: 0.06 K/UL (ref 0–0.2)
BILIRUB SERPL-MCNC: 0.77 MG/DL (ref 0.3–1.2)
BUN BLDV-MCNC: 19 MG/DL (ref 6–20)
BUN/CREAT BLD: NORMAL (ref 9–20)
CALCIUM SERPL-MCNC: 9.4 MG/DL (ref 8.6–10.4)
CHLORIDE BLD-SCNC: 106 MMOL/L (ref 98–107)
CHOLESTEROL/HDL RATIO: 4.5
CHOLESTEROL: 231 MG/DL
CO2: 24 MMOL/L (ref 20–31)
CREAT SERPL-MCNC: 0.89 MG/DL (ref 0.7–1.2)
DIFFERENTIAL TYPE: ABNORMAL
EOSINOPHILS RELATIVE PERCENT: 5 % (ref 1–4)
GFR AFRICAN AMERICAN: >60 ML/MIN
GFR NON-AFRICAN AMERICAN: >60 ML/MIN
GFR SERPL CREATININE-BSD FRML MDRD: NORMAL ML/MIN/{1.73_M2}
GFR SERPL CREATININE-BSD FRML MDRD: NORMAL ML/MIN/{1.73_M2}
GLUCOSE BLD-MCNC: 93 MG/DL (ref 70–99)
HCT VFR BLD CALC: 46.4 % (ref 40.7–50.3)
HDLC SERPL-MCNC: 51 MG/DL
HEMOGLOBIN: 14.8 G/DL (ref 13–17)
IMMATURE GRANULOCYTES: 0 %
LDL CHOLESTEROL: 161 MG/DL (ref 0–130)
LYMPHOCYTES # BLD: 40 % (ref 24–43)
MCH RBC QN AUTO: 29.1 PG (ref 25.2–33.5)
MCHC RBC AUTO-ENTMCNC: 31.9 G/DL (ref 28.4–34.8)
MCV RBC AUTO: 91.2 FL (ref 82.6–102.9)
MONOCYTES # BLD: 11 % (ref 3–12)
NRBC AUTOMATED: 0 PER 100 WBC
PDW BLD-RTO: 12.5 % (ref 11.8–14.4)
PLATELET # BLD: 241 K/UL (ref 138–453)
PLATELET ESTIMATE: ABNORMAL
PMV BLD AUTO: 10.8 FL (ref 8.1–13.5)
POTASSIUM SERPL-SCNC: 4.2 MMOL/L (ref 3.7–5.3)
RBC # BLD: 5.09 M/UL (ref 4.21–5.77)
RBC # BLD: ABNORMAL 10*6/UL
SEG NEUTROPHILS: 43 % (ref 36–65)
SEGMENTED NEUTROPHILS ABSOLUTE COUNT: 2.18 K/UL (ref 1.5–8.1)
SODIUM BLD-SCNC: 142 MMOL/L (ref 135–144)
TOTAL PROTEIN: 6.7 G/DL (ref 6.4–8.3)
TRIGL SERPL-MCNC: 93 MG/DL
VLDLC SERPL CALC-MCNC: ABNORMAL MG/DL (ref 1–30)
WBC # BLD: 4.9 K/UL (ref 3.5–11.3)
WBC # BLD: ABNORMAL 10*3/UL

## 2021-03-23 ENCOUNTER — IMMUNIZATION (OUTPATIENT)
Dept: PRIMARY CARE CLINIC | Age: 46
End: 2021-03-23
Payer: COMMERCIAL

## 2021-03-23 PROCEDURE — 0001A COVID-19, PFIZER VACCINE 30MCG/0.3ML DOSE: CPT | Performed by: INTERNAL MEDICINE

## 2021-03-23 PROCEDURE — 91300 COVID-19, PFIZER VACCINE 30MCG/0.3ML DOSE: CPT | Performed by: INTERNAL MEDICINE

## 2021-03-24 ENCOUNTER — TELEPHONE (OUTPATIENT)
Dept: FAMILY MEDICINE CLINIC | Age: 46
End: 2021-03-24

## 2021-03-24 DIAGNOSIS — E78.5 DYSLIPIDEMIA: Primary | ICD-10-CM

## 2021-03-24 NOTE — TELEPHONE ENCOUNTER
Patient is requesting his PCP Dr Bladimir Torrez to order a full run of blood work because the patient has changed his diet and would like to see if there have been any changes. Please contact patient if there any problems fulfilling this order of blood work or to notify the patient that its ordered. Thank you!

## 2021-04-01 ENCOUNTER — HOSPITAL ENCOUNTER (OUTPATIENT)
Age: 46
Setting detail: SPECIMEN
Discharge: HOME | End: 2021-04-01
Payer: COMMERCIAL

## 2021-04-01 DIAGNOSIS — E78.5 DYSLIPIDEMIA: ICD-10-CM

## 2021-04-01 LAB
ALBUMIN SERPL-MCNC: 4.7 G/DL (ref 3.5–5.2)
ALBUMIN/GLOBULIN RATIO: 1.7 (ref 1–2.5)
ALP BLD-CCNC: 61 U/L (ref 40–129)
ALT SERPL-CCNC: 19 U/L (ref 5–41)
ANION GAP SERPL CALCULATED.3IONS-SCNC: 8 MMOL/L (ref 9–17)
AST SERPL-CCNC: 21 U/L
BILIRUB SERPL-MCNC: 0.43 MG/DL (ref 0.3–1.2)
BUN BLDV-MCNC: 18 MG/DL (ref 6–20)
BUN/CREAT BLD: ABNORMAL (ref 9–20)
CALCIUM SERPL-MCNC: 9.3 MG/DL (ref 8.6–10.4)
CHLORIDE BLD-SCNC: 103 MMOL/L (ref 98–107)
CHOLESTEROL/HDL RATIO: 4.3
CHOLESTEROL: 231 MG/DL
CO2: 27 MMOL/L (ref 20–31)
CREAT SERPL-MCNC: 0.97 MG/DL (ref 0.7–1.2)
GFR AFRICAN AMERICAN: >60 ML/MIN
GFR NON-AFRICAN AMERICAN: >60 ML/MIN
GFR SERPL CREATININE-BSD FRML MDRD: ABNORMAL ML/MIN/{1.73_M2}
GFR SERPL CREATININE-BSD FRML MDRD: ABNORMAL ML/MIN/{1.73_M2}
GLUCOSE BLD-MCNC: 96 MG/DL (ref 70–99)
HDLC SERPL-MCNC: 54 MG/DL
LDL CHOLESTEROL: 159 MG/DL (ref 0–130)
POTASSIUM SERPL-SCNC: 4.6 MMOL/L (ref 3.7–5.3)
SODIUM BLD-SCNC: 138 MMOL/L (ref 135–144)
TOTAL PROTEIN: 7.4 G/DL (ref 6.4–8.3)
TRIGL SERPL-MCNC: 88 MG/DL
VLDLC SERPL CALC-MCNC: ABNORMAL MG/DL (ref 1–30)

## 2021-04-13 ENCOUNTER — IMMUNIZATION (OUTPATIENT)
Dept: PRIMARY CARE CLINIC | Age: 46
End: 2021-04-13
Payer: COMMERCIAL

## 2021-04-13 PROCEDURE — 0002A COVID-19, PFIZER VACCINE 30MCG/0.3ML DOSE: CPT | Performed by: INTERNAL MEDICINE

## 2021-04-13 PROCEDURE — 91300 COVID-19, PFIZER VACCINE 30MCG/0.3ML DOSE: CPT | Performed by: INTERNAL MEDICINE

## 2021-06-25 ENCOUNTER — OFFICE VISIT (OUTPATIENT)
Dept: FAMILY MEDICINE CLINIC | Age: 46
End: 2021-06-25
Payer: COMMERCIAL

## 2021-06-25 VITALS
SYSTOLIC BLOOD PRESSURE: 120 MMHG | HEART RATE: 113 BPM | DIASTOLIC BLOOD PRESSURE: 70 MMHG | OXYGEN SATURATION: 98 % | WEIGHT: 180.4 LBS | BODY MASS INDEX: 26.64 KG/M2

## 2021-06-25 DIAGNOSIS — K21.9 GASTROESOPHAGEAL REFLUX DISEASE, UNSPECIFIED WHETHER ESOPHAGITIS PRESENT: ICD-10-CM

## 2021-06-25 DIAGNOSIS — M25.512 CHRONIC PAIN OF BOTH SHOULDERS: ICD-10-CM

## 2021-06-25 DIAGNOSIS — E78.5 DYSLIPIDEMIA: Primary | ICD-10-CM

## 2021-06-25 DIAGNOSIS — M25.511 CHRONIC PAIN OF BOTH SHOULDERS: ICD-10-CM

## 2021-06-25 DIAGNOSIS — G89.29 CHRONIC PAIN OF BOTH SHOULDERS: ICD-10-CM

## 2021-06-25 DIAGNOSIS — Z87.19 HISTORY OF BLOODY STOOLS: ICD-10-CM

## 2021-06-25 DIAGNOSIS — M77.12 LEFT TENNIS ELBOW: ICD-10-CM

## 2021-06-25 PROBLEM — R19.5 HEME POSITIVE STOOL: Status: RESOLVED | Noted: 2018-12-31 | Resolved: 2021-06-25

## 2021-06-25 PROCEDURE — 90715 TDAP VACCINE 7 YRS/> IM: CPT | Performed by: FAMILY MEDICINE

## 2021-06-25 PROCEDURE — 90471 IMMUNIZATION ADMIN: CPT | Performed by: FAMILY MEDICINE

## 2021-06-25 PROCEDURE — 99214 OFFICE O/P EST MOD 30 MIN: CPT | Performed by: FAMILY MEDICINE

## 2021-06-25 ASSESSMENT — ENCOUNTER SYMPTOMS
BELCHING: 0
HOARSE VOICE: 0
ABDOMINAL PAIN: 0
STRIDOR: 0
HEARTBURN: 1
CHOKING: 0
EYES NEGATIVE: 1
GLOBUS SENSATION: 0
SHORTNESS OF BREATH: 0
WATER BRASH: 0
WHEEZING: 0
NAUSEA: 0
RESPIRATORY NEGATIVE: 1
SORE THROAT: 0
ALLERGIC/IMMUNOLOGIC NEGATIVE: 1
COUGH: 0

## 2021-06-25 ASSESSMENT — PATIENT HEALTH QUESTIONNAIRE - PHQ9
SUM OF ALL RESPONSES TO PHQ9 QUESTIONS 1 & 2: 0
SUM OF ALL RESPONSES TO PHQ QUESTIONS 1-9: 0
SUM OF ALL RESPONSES TO PHQ QUESTIONS 1-9: 0
1. LITTLE INTEREST OR PLEASURE IN DOING THINGS: 0
SUM OF ALL RESPONSES TO PHQ QUESTIONS 1-9: 0
2. FEELING DOWN, DEPRESSED OR HOPELESS: 0

## 2021-06-25 NOTE — PROGRESS NOTES
APSO Progress Note    Date:6/25/2021         Patient Name:Kenan Espinoza     Date of Birth:[birth year]     Age:46 y.o. Assessment/Plan        Problem List Items Addressed This Visit        Digestive    Gastroesophageal reflux disease      Resolved - discussed risk factors, aggravating factors, and OTC medications to try if reoccurs            Musculoskeletal and Integument    Left tennis elbow     Most likely aggravated by softball pitching  Ice, NSAIDs prn, home exercises            Other    Dyslipidemia - Primary      Discussed ASCVD risk of 1.9%  Not at level to add statin  [facility] hist of CAD  Counseled at length         History of bloody stools     Previous cscope in 2019 reviewed         Chronic pain of both shoulders      Previously had MRI that was normal  Continue home pt exercises prn                Return in about 6 months (around 12/25/2021). Electronically signed by Ehsan Shin DO on 6/25/21         Yani Rucker is a 42 y.o. male presenting today for   Chief Complaint   Patient presents with    Establish Care    Hyperlipidemia    Discuss Labs   . The 10-year ASCVD risk score (Fannie Mas, et al., 2013) is: 1.9%    Values used to calculate the score:      Age: 42 years      Sex: Male      Is Non- : No      Diabetic: No      Tobacco smoker: No      Systolic Blood Pressure: 914 mmHg      Is BP treated: No      HDL Cholesterol: 54 mg/dL      Total Cholesterol: 231 mg/dL    Works a  by [employer] and works  for [address] one son and one daughter. From [place] originally, had moved [facility] to [facility] for work then back here    Had bloody stools in past a few years ago and had a colonoscopy that was unremarkable in 2019    Hyperlipidemia  This is a chronic problem. The current episode started more than 1 year ago. The problem is uncontrolled. Recent lipid tests were reviewed and are high.  He has no history of chronic renal disease, diabetes, hypothyroidism, liver disease, obesity or nephrotic syndrome. Associated symptoms include chest pain. Pertinent negatives include no focal sensory loss, focal weakness, leg pain, myalgias or shortness of breath. Current antihyperlipidemic treatment includes diet change and exercise. The current treatment provides moderate improvement of lipids. Gastroesophageal Reflux  He complains of chest pain and heartburn. He reports no abdominal pain, no belching, no choking, no coughing, no dysphagia, no early satiety, no globus sensation, no hoarse voice, no nausea, no sore throat, no stridor, no tooth decay, no water brash or no wheezing. This is a new problem. The current episode started more than 1 month ago. The problem has been resolved. The heartburn duration is several minutes. The heartburn is located in the substernum. The heartburn is of moderate intensity. The heartburn changes with position. The symptoms are aggravated by certain foods, ETOH, caffeine and lying down. Pertinent negatives include no anemia, fatigue, melena, muscle weakness, orthopnea or weight loss. He has tried a diet change and ETOH reduction for the symptoms. The treatment provided significant relief. Shoulder Pain   The pain is present in the left shoulder and right shoulder. This is a chronic problem. The current episode started more than 1 year ago. The problem occurs intermittently. The problem has been waxing and waning. The quality of the pain is described as aching. The pain is mild. Pertinent negatives include no fever, inability to bear weight, itching, joint locking, joint swelling, limited range of motion, numbness, stiffness or tingling. Treatments tried: previously had MRI and has done therapy execises. The treatment provided moderate relief. There is no history of diabetes. Arm Pain   There was no injury mechanism. The pain is present in the left elbow.  The quality of the pain is described as Thought content normal.         Judgment: Judgment normal.         Labs/Imaging/Diagnostics   Labs:  No results found for: CMPWITHGFR, CBCAUTODIF, TSHFT4, LABA1C, LIPIDPAN    Imaging Last 24 Hours:  XR SHOULDER LEFT (MIN 2 VIEWS)  Xrays: 4 views of the left shoulder obtained on 6/23/2020 were   independently reviewed  Indications: Left shoulder pain  Findings:  Normal glenohumeral and acromioclavicular joint spaces with a   type 2 acromion.   Impression: Normal left shoulder radiograph

## 2021-06-25 NOTE — PATIENT INSTRUCTIONS
Patient Education        Learning About Low-Fat Eating  What is low-fat eating? Most food has some fat in it. Your body needs some fat to be healthy. But some kinds of fats are healthier than others. In a low-fat eating plan, you try to choose healthier fats and eat fewer unhealthy fats. Healthy fats include olive and canola oil. Try to avoid eating too much saturated fat, such as in cheese and meats. You do not need to cut all fat from your diet. But you can make healthier choices about the types and amount of fat you eat. Even though it is a good idea to choose healthier fats, it is still important to be careful of how much fat you eat, because all fats are high in calories. What are the different types of fats? Unhealthy fat  · Saturated fat. Saturated fats are mostly in animal foods, such as meat and dairy foods. Tropical oils, such as coconut oil, palm oil, and cocoa butter, are also saturated fats. Healthy fats  · Monounsaturated fat. Monounsaturated fats are liquid at room temperature but get solid when refrigerated. Eating foods that are high in this fat may help lower your \"bad\" (LDL) cholesterol, keep your \"good\" (HDL) cholesterol level up, and lower your chances of getting coronary artery disease. This fat is found in canola oil, olive oil, peanut oil, olives, avocados, nuts, and nut butters. · Polyunsaturated fat. Polyunsaturated fats are liquid at room temperature. They are in safflower, sunflower, and corn oils. They are also the main fat in seafood. Omega-3 fatty acids are types of polyunsaturated fat. Eating fish may lower your chances of getting coronary artery disease. Fatty fish such as salmon and mackerel contain these healthy fatty acids. So do ground flaxseeds and flaxseed oil, soybeans, walnuts, and seeds. Why cut down on unhealthy fats? Eating foods that contain saturated fats can raise the LDL (\"bad\") cholesterol in your blood.  Having a high level of LDL cholesterol increases your chance of hardening of the arteries (atherosclerosis), which can lead to heart disease, heart attack, and stroke. In general:  · No more than 10% of your daily calories should come from saturated fat. This is about 20 grams in a 2,000-calorie diet. · No more than 10% of your daily calories should come from polyunsaturated fat. This is about 20 grams in a 2,000-calorie diet. · Monounsaturated fats can be up to 15% of your daily calories. This is about 25 to 30 grams in a 2,000-calorie diet. If you're not sure how much fat you should be eating or how many calories you need each day to stay at a healthy weight, talk to a registered dietitian. A dietitian can help you create a plan that's right for you. What can you do to cut down on fat? Foods like cheese, butter, sausage, and desserts can have a lot of unhealthy fats. Try these tips for healthier meals at home and when you eat out. At home  · Fill up on fruits, vegetables, and whole grains. · Think of meat as a side dish instead of as the main part of your meal.  · When you do eat meat, make it extra-lean ground beef (97% lean), ground turkey breast (without skin added), meats with fat trimmed off before cooking, or skinless chicken. · Try main dishes that use whole wheat pasta, brown rice, dried beans, or vegetables. · Use cooking methods that use little or no fat, such as broiling, steaming, or grilling. Use cooking spray instead of oil. If you use oil, use a monounsaturated oil, such as canola or olive oil. · Read food labels on canned, bottled, or packaged foods. Choose those with little saturated fat. When eating out at a restaurant  · Order foods that are broiled or poached instead of fried or breaded. · Cut back on the amount of butter or margarine that you use on bread. Use small amounts of olive oil instead. · Order sauces, gravies, and salad dressings on the side, and use only a little.   · When you order pasta, choose tomato sauce instead of cream sauce. · Ask for salsa with your baked potato instead of sour cream, butter, cheese, or ko. Where can you learn more? Go to https://[employer]ByGroupeb.POPAPP. org and sign in to your [employer] account. Enter W324 in the [facility] box to learn more about \"Learning About Low-Fat Eating. \"     If you do not have an account, please click on the \"Sign Up Now\" link. Current as of: December 17, 2020               Content Version: 12.9  © 7555-3852 Healthwise, Incorporated. Care instructions adapted under license by [facility] ([facility]). If you have questions about a medical condition or this instruction, always ask your healthcare professional. [address] any warranty or liability for your use of this information.

## 2021-08-16 ENCOUNTER — OFFICE VISIT (OUTPATIENT)
Dept: ORTHOPEDIC SURGERY | Age: 46
End: 2021-08-16
Payer: COMMERCIAL

## 2021-08-16 VITALS — WEIGHT: 180 LBS | HEIGHT: 69 IN | BODY MASS INDEX: 26.66 KG/M2

## 2021-08-16 DIAGNOSIS — M77.12 LEFT TENNIS ELBOW: Primary | ICD-10-CM

## 2021-08-16 DIAGNOSIS — M25.522 LEFT ELBOW PAIN: ICD-10-CM

## 2021-08-16 PROCEDURE — 99213 OFFICE O/P EST LOW 20 MIN: CPT | Performed by: ORTHOPAEDIC SURGERY

## 2021-08-16 NOTE — PROGRESS NOTES
Orthopedic Elbow Encounter Note     Chief complaint: left elbow pain    HPI: Nuha Prince is a 42 y.o. left-hand dominant male who presents for evaluation of His left elbow. He indicates that he has been having some pain in the left elbow for the past couple of months. He has had problems on and off for couple years now. 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 This is resulted in pain primarily localized to the lateral aspect of the elbow. He is also noticed some pain in the same location with his workouts especially with biceps curls and to a left circumflex stent hammer curls. He states that he can open jar fine but other motions are quite bothersome. He has not noticed any loss of strength or any stiffness. He has not really laid off of his activities either but has tried to modify them. He is also attempted using a counterforce strap but this has failed to provide any significant pain relief. He does report that he has had a previous diagnosis of tennis elbow and had a cortisone injection about 10 years ago. Previous treatment:    NSAIDs: None    Injections: Left elbow cortisone injection 10 years ago    Physical therapy: No    Surgeries: None    Review of Systems:     Constitution: no fever or chills   Pain level: 7/10  Musculoskeletal: As noted in the HPI   Neurologic: no neurologic symptoms    Past Medical Hx, Past Surgical Hx, Medications, Allergies, Social Hx: These were all reviewed. Please refer to Electronic Medical Records for details.      Physical Exam:     Ht 5' 9\" (1.753 m)   Wt 180 lb (81.6 kg)   BMI 26.58 kg/m²    General Appearance: alert, well appearing, and in no distress  Mental Status: alert, oriented to person, place, and time  Gait: normal    Elbows:    Skin: warm and dry, no rash or erythema  Vasculature: 2+ radial pulses bilaterally  Sensation: Intact to light touch in radial, ulnar, and median nerve distributions bilaterally    ROM: (Degrees)    Right   A  Left   A     Flexion   140  Flexion   140   Extension  0  Extension  0    Pronation  70  Pronaton  70   Supination  65  Supination  65     Crepitation  No  Crepitation  No    Muscle strength:    Right       Left    Biceps   5    Biceps   5  Triceps  5    Triceps  5  Wrist flexion  5    Wrist flexion  5  Wrist extension 5    Wrist extension 5  Intrinsics  5    Intrinsics  5    Tenderness: None     tenderness: Lateral epicondyle    Special tests    Right    Ulnar Nerve     Left  n    Cubital tunnel bend    n  n    Tinnel's at elbow    n        Biceps  n    Bicipital tenderness    n  n    Defect at biceps insertion   n        Instability  n    LCL instability     n  n    MCL instability     n  n    Moving valgus test    n  n    Milk sign     n  n    PLRI pivot     n        Epicondylitis  n    Resisted WE Pain    y  n    Resisted WF Pain    n  n    Resisted Supination Pain   n  n    Resisted Pronation Pain   n        Arthritis  n    Clunk      n  n    Pain at extremes of motion   n  n    Pain during arc of motion   n      Imaging:  Xrays: 3 views of the left elbow obtained on 8/16/2021 were independently reviewed  Indications: Left elbow pain  Findings: Normal joint spaces without obvious fracture, dislocation, subluxation or notable osseous abnormality. Impression: Normal-appearing left elbow radiographs. Impression/Plan:     Chantal Lyle is a 42 y.o. old male with left elbow pain that is consistent with lateral epicondylitis. I had a discussion with the patient today with regards to the nature and extent of his problem. While this is typically described as a self limiting problem we discussed treatment options available to him.  I recommend proceeding with conservative management involving icing, bracing with a wrist control splint, use of a topical anti-inflammatory, and a home stretching and eccentric strengthening program. he was provided with this program, a

## 2021-10-27 ENCOUNTER — TELEPHONE (OUTPATIENT)
Dept: FAMILY MEDICINE CLINIC | Age: 46
End: 2021-10-27

## 2021-10-27 NOTE — TELEPHONE ENCOUNTER
----- Message from Tonie Koyanagi sent at 10/27/2021 10:28 AM EDT -----  Subject: Message to Provider    QUESTIONS  Information for Provider? Pt has sore throat going on a couple weeks, was   given antibiotics for tonsilitis but not really getting better and pt   wants to know if he can get another pack of antibiotics or if he should   make an appt. please call back  ---------------------------------------------------------------------------  --------------  CALL BACK INFO  What is the best way for the office to contact you? OK to leave message on   voicemail  Preferred Call Back Phone Number? [phone]  ---------------------------------------------------------------------------  --------------  SCRIPT ANSWERS  Relationship to Patient?  Self

## 2021-10-27 NOTE — TELEPHONE ENCOUNTER
Dr. De León An can you help us out with this one? Dr. Lavonne Gaston and Dr. Candida Fish are both out today. Thanks!

## 2021-10-28 NOTE — TELEPHONE ENCOUNTER
Pt says he went to urgent care about a week and a half ago. They gave a zpack to pt for tonsillitis. They did a strep test and it was negative. They did not test for covid. Throat has a few white spots which he gets with every sore throat,  he still has swollen glands, difficult to swallow, \"doesn't feel great to talk\"  he is not runny any temp, no body aches or headaches.

## 2022-03-31 ENCOUNTER — HOSPITAL ENCOUNTER (OUTPATIENT)
Age: 47
Setting detail: SPECIMEN
Discharge: HOME | End: 2022-03-31

## 2022-03-31 ENCOUNTER — OFFICE VISIT (OUTPATIENT)
Dept: FAMILY MEDICINE CLINIC | Age: 47
End: 2022-03-31
Payer: COMMERCIAL

## 2022-03-31 VITALS
WEIGHT: 175.8 LBS | OXYGEN SATURATION: 98 % | DIASTOLIC BLOOD PRESSURE: 90 MMHG | HEIGHT: 69 IN | HEART RATE: 78 BPM | BODY MASS INDEX: 26.04 KG/M2 | SYSTOLIC BLOOD PRESSURE: 120 MMHG

## 2022-03-31 DIAGNOSIS — E78.5 DYSLIPIDEMIA: ICD-10-CM

## 2022-03-31 DIAGNOSIS — Z00.00 ENCOUNTER FOR WELL ADULT EXAM WITHOUT ABNORMAL FINDINGS: Primary | ICD-10-CM

## 2022-03-31 DIAGNOSIS — R35.1 NOCTURIA: ICD-10-CM

## 2022-03-31 LAB
ALBUMIN SERPL-MCNC: 4.8 G/DL (ref 3.5–5.2)
ALBUMIN/GLOBULIN RATIO: 2 (ref 1–2.5)
ALP BLD-CCNC: 68 U/L (ref 40–129)
ALT SERPL-CCNC: 19 U/L (ref 5–41)
ANION GAP SERPL CALCULATED.3IONS-SCNC: 7 MMOL/L (ref 9–17)
AST SERPL-CCNC: 22 U/L
BILIRUB SERPL-MCNC: 0.59 MG/DL (ref 0.3–1.2)
BUN BLDV-MCNC: 17 MG/DL (ref 6–20)
CALCIUM SERPL-MCNC: 9.9 MG/DL (ref 8.6–10.4)
CHLORIDE BLD-SCNC: 104 MMOL/L (ref 98–107)
CHOLESTEROL/HDL RATIO: 4.4
CHOLESTEROL: 235 MG/DL
CO2: 28 MMOL/L (ref 20–31)
CREAT SERPL-MCNC: 1.04 MG/DL (ref 0.7–1.2)
GFR AFRICAN AMERICAN: >60 ML/MIN
GFR NON-AFRICAN AMERICAN: >60 ML/MIN
GFR SERPL CREATININE-BSD FRML MDRD: ABNORMAL ML/MIN/{1.73_M2}
GLUCOSE BLD-MCNC: 101 MG/DL (ref 70–99)
HDLC SERPL-MCNC: 54 MG/DL
LDL CHOLESTEROL: 165 MG/DL (ref 0–130)
POTASSIUM SERPL-SCNC: 4.7 MMOL/L (ref 3.7–5.3)
PROSTATE SPECIFIC ANTIGEN: 0.78 UG/L
SODIUM BLD-SCNC: 139 MMOL/L (ref 135–144)
TOTAL PROTEIN: 7.2 G/DL (ref 6.4–8.3)
TRIGL SERPL-MCNC: 78 MG/DL

## 2022-03-31 PROCEDURE — 99396 PREV VISIT EST AGE 40-64: CPT | Performed by: FAMILY MEDICINE

## 2022-03-31 SDOH — ECONOMIC STABILITY: FOOD INSECURITY: WITHIN THE PAST 12 MONTHS, THE FOOD YOU BOUGHT JUST DIDN'T LAST AND YOU DIDN'T HAVE MONEY TO GET MORE.: NEVER TRUE

## 2022-03-31 SDOH — ECONOMIC STABILITY: FOOD INSECURITY: WITHIN THE PAST 12 MONTHS, YOU WORRIED THAT YOUR FOOD WOULD RUN OUT BEFORE YOU GOT MONEY TO BUY MORE.: NEVER TRUE

## 2022-03-31 ASSESSMENT — SOCIAL DETERMINANTS OF HEALTH (SDOH): HOW HARD IS IT FOR YOU TO PAY FOR THE VERY BASICS LIKE FOOD, HOUSING, MEDICAL CARE, AND HEATING?: NOT HARD AT ALL

## 2022-03-31 ASSESSMENT — PATIENT HEALTH QUESTIONNAIRE - PHQ9
1. LITTLE INTEREST OR PLEASURE IN DOING THINGS: 0
2. FEELING DOWN, DEPRESSED OR HOPELESS: 0
SUM OF ALL RESPONSES TO PHQ QUESTIONS 1-9: 0
SUM OF ALL RESPONSES TO PHQ9 QUESTIONS 1 & 2: 0

## 2022-03-31 ASSESSMENT — ENCOUNTER SYMPTOMS
RESPIRATORY NEGATIVE: 1
ALLERGIC/IMMUNOLOGIC NEGATIVE: 1
EYES NEGATIVE: 1
GASTROINTESTINAL NEGATIVE: 1

## 2022-03-31 NOTE — PATIENT INSTRUCTIONS

## 2022-03-31 NOTE — PROGRESS NOTES
Well Adult Note  Name: Daniella Vázquez Date: 3/31/2022   MRN: [id number] Sex: Male   Age: 42 y.o. Ethnicity: Non- / Non    : 1975 Race: White (non-)      Yanira Mustafa is here for well adult exam.  History:  Yanira Mutsafa presents today for an annual physical.    Diet/Nutrition - in general, a \"healthy\" diet     Exercise/Activity - frequently cardiovascular workout on exercise equipment and weightlifting  Sleep Habits - up frequently at night to urinate  Diabetes - none  Cardiovascular Health - Hypertension? no Hyperlipidemia? Yes but not on medicines  Hearing - normal to conversation  Vision - No Problems negative  Tobacco/Smoking - Smoker? non-smoker Vape? No Smokeless? No   Alcohol - social drinker  Illicit Drugs - no history of illicit drug use  Sexual Habits - has sex with females  Skin Cancer - Fair skin/previous sun exposure? No Suspicious lesion? None - gets checked at [facility] periodically  Depression - denies  Anxiety - denies      Review of Systems   Constitutional: Negative. HENT: Negative. Eyes: Negative. Respiratory: Negative. Cardiovascular: Negative. Gastrointestinal: Negative. Endocrine: Negative. Genitourinary: Negative. Musculoskeletal: Negative. Skin: Negative. Allergic/Immunologic: Negative. Neurological: Negative. Hematological: Negative. Psychiatric/Behavioral: Negative. All other systems reviewed and are negative. Allergies   Allergen Reactions    Lodine [Etodolac]     Pcn [Penicillins]        Prior to Visit Medications    Not on File       Past Medical History:   Diagnosis Date    Hyperlipidemia     elevated        Past Surgical History:   Procedure Laterality Date    ADENOIDECTOMY      COLONOSCOPY N/A 2019    COLONOSCOPY POLYPECTOMY SNARE/COLD BIOPSY performed by Usha Tubbs MD [facility] N Bilateral 3/2013    LASIK         History reviewed.  No pertinent family history. Social History     Tobacco Use    Smoking status: Never Smoker    Smokeless tobacco: Never Used   Substance Use Topics    Alcohol use: Yes     Comment: weekly    Drug use: No       Objective   BP (!) 120/90 (Site: Left Upper Arm, Position: Sitting, Cuff Size: Medium Adult)   Pulse 78   Ht 5' 9\" (1.753 m)   Wt 175 lb 12.8 oz (79.7 kg)   SpO2 98%   BMI 25.96 kg/m²   Wt Readings from Last 3 Encounters:   03/31/22 175 lb 12.8 oz (79.7 kg)   08/16/21 180 lb (81.6 kg)   06/25/21 180 lb 6.4 oz (81.8 kg)     There were no vitals filed for this visit. Physical Exam  Vitals and nursing note reviewed. Constitutional:       General: He is not in acute distress. Appearance: Normal appearance. He is normal weight. He is not ill-appearing, toxic-appearing or diaphoretic. HENT:      Head: Normocephalic and atraumatic. Right Ear: Tympanic membrane, ear canal and external ear normal. There is no impacted cerumen. Left Ear: Tympanic membrane, ear canal and external ear normal. There is no impacted cerumen. Nose: Nose normal. No congestion or rhinorrhea. Mouth/Throat:      Mouth: Mucous membranes are moist.      Pharynx: Oropharynx is clear. No oropharyngeal exudate or posterior oropharyngeal erythema. Eyes:      General: No scleral icterus. Right eye: No discharge. Left eye: No discharge. Extraocular Movements: Extraocular movements intact. Conjunctiva/sclera: Conjunctivae normal.      Pupils: Pupils are equal, round, and reactive to light. Cardiovascular:      Rate and Rhythm: Normal rate and regular rhythm. Pulses: Normal pulses. Heart sounds: Normal heart sounds. No murmur heard. No friction rub. No gallop. Pulmonary:      Effort: Pulmonary effort is normal. No respiratory distress. Breath sounds: Normal breath sounds. No stridor. No wheezing, rhonchi or rales. Chest:      Chest wall: No tenderness.    Abdominal:      General: Bowel sounds are normal. There is no distension. Palpations: Abdomen is soft. There is no mass. Tenderness: There is no abdominal tenderness. There is no right CVA tenderness, left CVA tenderness, guarding or rebound. Hernia: No hernia is present. Musculoskeletal:         General: No tenderness, deformity or signs of injury. Normal range of motion. Cervical back: Normal range of motion and neck supple. No rigidity. No muscular tenderness. Skin:     General: Skin is warm. Capillary Refill: Capillary refill takes less than 2 seconds. Coloration: Skin is not jaundiced or pale. Findings: No bruising, erythema, lesion or rash. Neurological:      General: No focal deficit present. Mental Status: He is alert and oriented to person, place, and time. Mental status is at baseline. Motor: No weakness. Coordination: Coordination normal.      Gait: Gait normal.      Deep Tendon Reflexes: Reflexes normal.   Psychiatric:         Mood and Affect: Mood normal.         Behavior: Behavior normal.         Thought Content: Thought content normal.         Judgment: Judgment normal.           Assessment   Plan   1. Encounter for well adult exam without abnormal findings  2. Dyslipidemia  -     Comprehensive Metabolic Panel; Future  -     Lipid Panel; Future  3. Nocturia  -     PSA Screening;  Future         Personalized Preventive Plan   Current Health Maintenance Status  Immunization History   Administered Date(s) Administered    COVID-19, Pfizer Purple top, DILUTE for use, 12+ yrs, 30mcg/0.3mL dose 03/23/2021, 04/13/2021, 11/29/2021    Influenza Vaccine, unspecified formulation 10/29/2018    Influenza, Quadv, IM, (6 mo and older Fluzone, Flulaval, Fluarix and 3 yrs and older Afluria) 12/06/2019    Influenza, Quadv, IM, PF (6 mo and older Fluzone, Flulaval, Fluarix, and 3 yrs and older Afluria) 09/23/2020    Tdap (Boostrix, Adacel) 06/25/2021        Health Maintenance   Topic Date Due    Hepatitis C screen  Never done    HIV screen  Never done    Flu vaccine (1) 09/01/2021    Depression Screen  06/25/2022    Lipid screen  04/01/2026    DTaP/Tdap/Td vaccine (2 - Td or Tdap) 06/25/2031    COVID-19 Vaccine  Completed    Hepatitis A vaccine  Aged Out    Hepatitis B vaccine  Aged Out    Hib vaccine  Aged Out    Meningococcal (ACWY) vaccine  Aged Out    Pneumococcal 0-64 years Vaccine  Aged Out     Recommendations for [employer]. Due: see orders and patient instructions/AVS.    No follow-ups on file.

## 2022-12-30 ENCOUNTER — HOSPITAL ENCOUNTER (OUTPATIENT)
Age: 47
Setting detail: SPECIMEN
Discharge: HOME | End: 2022-12-30

## 2022-12-30 ENCOUNTER — OFFICE VISIT (OUTPATIENT)
Dept: FAMILY MEDICINE CLINIC | Age: 47
End: 2022-12-30
Payer: COMMERCIAL

## 2022-12-30 VITALS
SYSTOLIC BLOOD PRESSURE: 120 MMHG | BODY MASS INDEX: 26.29 KG/M2 | HEART RATE: 82 BPM | OXYGEN SATURATION: 98 % | WEIGHT: 178 LBS | DIASTOLIC BLOOD PRESSURE: 70 MMHG

## 2022-12-30 DIAGNOSIS — E78.5 DYSLIPIDEMIA: ICD-10-CM

## 2022-12-30 DIAGNOSIS — E78.5 DYSLIPIDEMIA: Primary | ICD-10-CM

## 2022-12-30 DIAGNOSIS — K21.9 GASTROESOPHAGEAL REFLUX DISEASE, UNSPECIFIED WHETHER ESOPHAGITIS PRESENT: ICD-10-CM

## 2022-12-30 LAB
CHOLESTEROL/HDL RATIO: 4.1
CHOLESTEROL: 261 MG/DL
HDLC SERPL-MCNC: 63 MG/DL
LDL CHOLESTEROL: 181 MG/DL (ref 0–130)
TRIGL SERPL-MCNC: 87 MG/DL

## 2022-12-30 PROCEDURE — 99214 OFFICE O/P EST MOD 30 MIN: CPT | Performed by: FAMILY MEDICINE

## 2022-12-30 ASSESSMENT — ENCOUNTER SYMPTOMS
SHORTNESS OF BREATH: 0
SORE THROAT: 0
COUGH: 0
HOARSE VOICE: 0
BELCHING: 0
ABDOMINAL PAIN: 0
WATER BRASH: 0
HEARTBURN: 1
WHEEZING: 0
CHOKING: 0
GLOBUS SENSATION: 0
NAUSEA: 0
STRIDOR: 0

## 2022-12-30 NOTE — PROGRESS NOTES
[facility] Progress Note    Date:12/30/2022         Patient Name:Kenan Espinoza     Date of Birth:[birth year]     Age:47 y.o. Assessment/Plan        Problem List Items Addressed This Visit          Digestive    Gastroesophageal reflux disease      Well-controlled, continue current treatment plan            Other    Dyslipidemia - Primary      Borderline controlled, continue current plan pending work up below and lifestyle modifications recommended         Relevant Orders    Lipid Panel (Completed)    Lipid Panel        Return in about 6 months (around 6/30/2023). Electronically signed by Ej Mendoza DO on 12/30/22       Total time spent was between Time personally spent assessing and managing the patient on the date of service: Est: 20-29 minutes (14492) mins. This included time spent reviewing the patient's medical record (e.g., recent visits, labs, and studies); seeing the patient in the office (face-to-face time); ordering medications, studies, procedures, or referrals; calling the patient or family later in the day with results and further recommendations; and documenting the visit in the medical record. Yani Villa is a 41 y.o. male presenting today for   Chief Complaint   Patient presents with    Follow-up     Dyslipidemia    . The 10-year ASCVD risk score (Robbin DK, et al., 2019) is: 2.2%    Values used to calculate the score:      Age: 41 years      Sex: Male      Is Non- : No      Diabetic: No      Tobacco smoker: No      Systolic Blood Pressure: 090 mmHg      Is BP treated: No      HDL Cholesterol: 54 mg/dL      Total Cholesterol: 235 mg/dL    Hyperlipidemia  This is a chronic problem. The current episode started more than 1 year ago. The problem is uncontrolled. Recent lipid tests were reviewed and are high. He has no history of chronic renal disease, hypothyroidism, liver disease, obesity or nephrotic syndrome.  Pertinent negatives include no focal sensory loss, focal weakness, leg pain, myalgias or shortness of breath. Current antihyperlipidemic treatment includes diet change and exercise. The current treatment provides moderate improvement of lipids. Gastroesophageal Reflux  He complains of heartburn. He reports no abdominal pain, no belching, no choking, no coughing, no dysphagia, no early satiety, no globus sensation, no hoarse voice, no nausea, no sore throat, no stridor, no tooth decay, no water brash or no wheezing. This is a new problem. The current episode started more than 1 month ago. The problem has been resolved. The heartburn duration is several minutes. The heartburn is located in the substernum. The heartburn is of moderate intensity. The heartburn changes with position. The symptoms are aggravated by certain foods, ETOH, caffeine and lying down. Pertinent negatives include no anemia, fatigue, melena, muscle weakness, orthopnea or weight loss. He has tried a diet change and ETOH reduction for the symptoms. The treatment provided significant relief. Review of Systems   Review of Systems   Constitutional: Negative. Negative for fatigue and weight loss. HENT: Negative. Negative for hoarse voice and sore throat. Eyes: Negative. Respiratory: Negative. Negative for cough, choking, shortness of breath and wheezing. Cardiovascular: Negative. Gastrointestinal:  Positive for heartburn. Negative for abdominal pain, dysphagia, melena and nausea. Endocrine: Negative. Genitourinary: Negative. Musculoskeletal: Negative. Negative for myalgias and muscle weakness. Skin: Negative. Allergic/Immunologic: Negative. Neurological: Negative. Negative for focal weakness. Hematological: Negative. Psychiatric/Behavioral: Negative. All other systems reviewed and are negative. Medications     No current outpatient medications on file. No current facility-administered medications for this visit.        Past History    Past Medical History:   has a past medical history of Hyperlipidemia. Social History:   reports that he has never smoked. He has never used smokeless tobacco. He reports current alcohol use. He reports that he does not use drugs. Family History: No family history on file. Surgical History:   Past Surgical History:   Procedure Laterality Date    ADENOIDECTOMY      COLONOSCOPY N/A 1/4/2019    COLONOSCOPY POLYPECTOMY SNARE/COLD BIOPSY performed by Miranda Blevins MD at [address] Bilateral 3/2013    [facility]          Physical Examination      Vitals:  /70   Pulse 82   Wt 178 lb (80.7 kg)   SpO2 98%   BMI 26.29 kg/m²     Physical Exam  Vitals and nursing note reviewed. Constitutional:       General: He is not in acute distress. Appearance: Normal appearance. He is overweight. He is not ill-appearing, toxic-appearing or diaphoretic. HENT:      Head: Normocephalic and atraumatic. Right Ear: External ear normal.      Left Ear: External ear normal.   Eyes:      General: No scleral icterus. Right eye: No discharge. Left eye: No discharge. Conjunctiva/sclera: Conjunctivae normal.   Cardiovascular:      Rate and Rhythm: Normal rate and regular rhythm. Pulses: Normal pulses. Heart sounds: Normal heart sounds. No murmur heard. No friction rub. No gallop. Pulmonary:      Effort: Pulmonary effort is normal. No respiratory distress. Breath sounds: Normal breath sounds. No stridor. No wheezing, rhonchi or rales. Chest:      Chest wall: No tenderness. Skin:     General: Skin is warm. Coloration: Skin is not jaundiced or pale. Neurological:      Mental Status: He is alert and oriented to person, place, and time. Mental status is at baseline. Psychiatric:         Mood and Affect: Mood normal.         Behavior: Behavior normal.         Thought Content:  Thought content normal.         Judgment: Judgment normal. Labs/Imaging/Diagnostics   Labs:  No results found for: CMPWITHGFR, CBCAUTODIF, TSHFT4, LABA1C, LIPIDPAN    Imaging Last 24 Hours:  XR ELBOW LEFT (MIN 3 VIEWS)  Xrays: 3 views of the left elbow obtained on 8/16/2021 were independently   reviewed  Indications: Left elbow pain  Findings: Normal joint spaces without obvious fracture, dislocation,   subluxation or notable osseous abnormality. Impression: Normal-appearing left elbow radiographs.

## 2022-12-30 NOTE — PATIENT INSTRUCTIONS
Patient Education        Learning About Low-Fat Eating  What is low-fat eating? Most food has some fat in it. Your body needs some fat to be healthy. But some kinds of fats are healthier than others. In a low-fat eating plan, you try to choose healthier fats and eat fewer unhealthy fats. Healthy fats include olive and canola oil. Try to avoid eating too much saturated fat, such as in cheese and meats. You do not need to cut all fat from your diet. But you can make healthier choices about the types and amount of fat you eat. Even though it is a good idea to choose healthier fats, it is still important to be careful of how much fat you eat, because all fats are high in calories. What are the different types of fats? Unhealthy fat  Saturated fat. Saturated fats are mostly in animal foods, such as meat and dairy foods. Tropical oils, such as coconut oil, palm oil, and cocoa butter, are also saturated fats. Healthy fats  Monounsaturated fat. Monounsaturated fats are liquid at room temperature but get solid when refrigerated. Eating foods that are high in this fat may help lower your \"bad\" (LDL) cholesterol, keep your \"good\" (HDL) cholesterol level up, and lower your chances of getting coronary artery disease. This fat is found in canola oil, olive oil, peanut oil, olives, avocados, nuts, and nut butters. Polyunsaturated fat. Polyunsaturated fats are liquid at room temperature. They are in safflower, sunflower, and corn oils. They are also the main fat in seafood. Omega-3 fatty acids are types of polyunsaturated fat. Eating fish may lower your chances of getting coronary artery disease. Fatty fish such as salmon and mackerel contain these healthy fatty acids. So do ground flaxseeds and flaxseed oil, soybeans, walnuts, and seeds. Why cut down on unhealthy fats? Eating foods that contain saturated fats can raise the LDL (\"bad\") cholesterol in your blood.  Having a high level of LDL cholesterol increases your chance of hardening of the arteries (atherosclerosis), which can lead to heart disease, heart attack, and stroke. In general:  No more than 10% of your daily calories should come from saturated fat. This is about 20 grams in a 2,000-calorie diet. No more than 10% of your daily calories should come from polyunsaturated fat. This is about 20 grams in a 2,000-calorie diet. Monounsaturated fats can be up to 15% of your daily calories. This is about 25 to 30 grams in a 2,000-calorie diet. If you're not sure how much fat you should be eating or how many calories you need each day to stay at a healthy weight, talk to a registered dietitian. A dietitian can help you create a plan that's right for you. What can you do to cut down on fat? Foods like cheese, butter, sausage, and desserts can have a lot of unhealthy fats. Try these tips for healthier meals at home and when you eat out. At home  Fill up on fruits, vegetables, and whole grains. Think of meat as a side dish instead of as the main part of your meal.  When you do eat meat, make it extra-lean ground beef (97% lean), ground turkey breast (without skin added), meats with fat trimmed off before cooking, or skinless chicken. Try main dishes that use whole wheat pasta, brown rice, dried beans, or vegetables. Use cooking methods that use little or no fat, such as broiling, steaming, or grilling. Use cooking spray instead of oil. If you use oil, use a monounsaturated oil, such as canola or olive oil. Read food labels on canned, bottled, or packaged foods. Choose those with little saturated fat. When eating out at a restaurant  Order foods that are broiled or poached instead of fried or breaded. Cut back on the amount of butter or margarine that you use on bread. Use small amounts of olive oil instead. Order sauces, gravies, and salad dressings on the side, and use only a little. When you order pasta, choose tomato sauce instead of cream sauce.   Ask for [employer] with your baked potato instead of sour cream, butter, cheese, or ko. Where can you learn more? Go to http://www.cueto.com/ and enter W495 to learn more about \"Learning About Low-Fat Eating. \"  Current as of: October 6, 2021               Content Version: 13.5  © 2006-2022 [employer]. Care instructions adapted under license by [facility] ([facility]). If you have questions about a medical condition or this instruction, always ask your healthcare professional. [address] any warranty or liability for your use of this information.

## 2023-01-02 ASSESSMENT — ENCOUNTER SYMPTOMS
EYES NEGATIVE: 1
RESPIRATORY NEGATIVE: 1
ALLERGIC/IMMUNOLOGIC NEGATIVE: 1

## 2023-01-03 NOTE — ASSESSMENT & PLAN NOTE
Borderline controlled, continue current plan pending work up below and lifestyle modifications recommended

## 2023-02-10 ENCOUNTER — OFFICE VISIT (OUTPATIENT)
Dept: ORTHOPEDIC SURGERY | Age: 48
End: 2023-02-10
Payer: COMMERCIAL

## 2023-02-10 VITALS — HEIGHT: 69 IN | RESPIRATION RATE: 14 BRPM | BODY MASS INDEX: 26.36 KG/M2 | WEIGHT: 178 LBS

## 2023-02-10 DIAGNOSIS — M19.012 OSTEOARTHRITIS OF LEFT AC (ACROMIOCLAVICULAR) JOINT: ICD-10-CM

## 2023-02-10 DIAGNOSIS — M75.22 BICEPS TENDINITIS OF LEFT SHOULDER: Primary | ICD-10-CM

## 2023-02-10 DIAGNOSIS — M25.512 LEFT SHOULDER PAIN, UNSPECIFIED CHRONICITY: ICD-10-CM

## 2023-02-10 PROCEDURE — 99214 OFFICE O/P EST MOD 30 MIN: CPT | Performed by: ORTHOPAEDIC SURGERY

## 2023-02-10 RX ORDER — DICLOFENAC SODIUM 75 MG/1
75 TABLET, DELAYED RELEASE ORAL 2 TIMES DAILY WITH MEALS
Qty: 28 TABLET | Refills: 0 | Status: SHIPPED | OUTPATIENT
Start: 2023-02-10 | End: 2023-02-24

## 2023-02-10 NOTE — PROGRESS NOTES
Orthopedic Shoulder Encounter Note     Chief complaint: left shoulder pain    HPI: Janie Espinosa is a 41 y.o.  right-hand dominant male who presents for evaluation of his left shoulder. He has been seen in the past for this issue and diagnosed with a biceps tendinitis. He was treated at the time with therapy and a prescription anti-inflammatory. He has been doing well up until recently when he developed pain once again in the shoulder while working out. His pain is localized to the anterior aspect of shoulder especially whenever he reaches towards his back but he also describes having pain over the top of the shoulder when he reaches overhead. He denies having any weakness but does have a little bit of stiffness in the shoulder. He has tried to keep up with his home exercise program from physical therapy in addition to working out lifting weights. Previous treatment:    NSAIDs: Voltaren    Physical Therapy:  Yes    Injections: None    Surgeries: None    Review of Systems:   Constitutional: Negative for fever, chills, sweats. Pain Score:   7  Neurological: Negative for headache, numbness, or weakness. Musculoskeletal: As noted in HPI     Past Medical History  Patito Madrid  has a past medical history of Hyperlipidemia. Past Surgical History  Patito Madrid  has a past surgical history that includes hernia repair (Bilateral, 3/2013); Adenoidectomy; LASIK; and Colonoscopy (N/A, 1/4/2019). Current Medications  Current Outpatient Medications   Medication Sig Dispense Refill    diclofenac (VOLTAREN) 75 MG EC tablet Take 1 tablet by mouth 2 times daily (with meals) for 14 days 28 tablet 0     No current facility-administered medications for this visit. Allergies  Allergies have been reviewed. Patito Madrid is allergic to lodine [etodolac] and pcn [penicillins]. Social History  Patito Madrid  reports that he has never smoked. He has never used smokeless tobacco. He reports current alcohol use.  He reports that he does not use drugs. Family History  Kenan's family history is not on file. Physical Exam:     Resp 14   Ht 5' 9\" (1.753 m) Comment: per pt  Wt 178 lb (80.7 kg) Comment: per pt  BMI 26.29 kg/m²    Constitutional: Patient is oriented to person, place, and time. Patient appears well-developed and well nourished. Mental Status/psychiatric: Behavior is normal. Thought content normal.  HENT: Negative otherwise noted  Head: Normocephalic and Atraumatic  Nose: Normal  Respiratory/Cardio: Effort normal. No respiratory distress. Shoulder:    Skin: Skin is warm and dry; no swelling or obvious muscular atrophy.    Vasculature: 2+ radial pulses bilaterally  Neuro: Sensation grossly intact to light touch diffusely  Tenderness: Tender to palpation over the anterior aspect and the [facility] joint of the left shoulder    ROM: (Degrees)    Right   A P   Left   A P    Elevation  150    Elevation  150 150  Abduction  150    Abduction  135  150  ER   75    ER   75 85  IR   L1    IR   L1   90 abd/ER      90 abd/ER     90 abd/IR      90 abd/IR     Crepitation  No    Crepitation  No  Dyskenesia  No    Dyskenesia  No      Muscle strength:    Right       Left    Deltoid   5    Deltoid   5  Supraspinatus  5    Supraspinatus  5  ER   5    ER   5  IR   5    IR   5    Special tests    Right   Rotator Cuff    Left    n   Painful arc    n   n   Pain with ER    n    n   Neer's     n    n   Hawkin's    n    n   Drop Arm    n  n   Lift off/Belly Press   n  n   ER Lag    n          AC Joint  n   AC tenderness   y  n   Cross-chest adduction  n       Labrum/biceps    n   Patillas's    y   n   Biceps sheer    y      n   Speed's/Yergason's   y    n   Tenderness Biceps Groove  y    n   Augusto's    n         Instability  n   Ant Apprehension   n    n   Post Apprehension   n    n   Ant Load shift    n    n   Post Load shift   n   n   Sulcus     n  n   Generalized Laxity   n  n   Relocation test   n  n   Crank test     n  n   Levi-superior escape  n       Imaging:  Xrays: 4 views of the left shoulder obtained on 2/10/2023 were independently reviewed  Indications: Left shoulder pain  Findings: Normal glenohumeral and acromioclavicular joint spaces. Mild cystic changes within the distal clavicle at the acromioclavicular joint. Type II acromion. No obvious fracture, dislocation or subluxation. Impression: Left shoulder radiographs with mild degenerative changes at the acromioclavicular joint as outlined above. Impression/Plan:     kSy Myers is a 41 y.o. old male with left shoulder pain consistent with acromioclavicular joint arthritis as well as biceps tendinitis. I had a discussion with the patient today educating him about these conditions and discussed treatment options available to him recommending we proceed conservatively. We discussed use of cortisone injections as a tool for diagnosis as well as therapy but he would like to avoid this and so he was placed on a 2-week course of Voltaren and a prescription for physical therapy was again provided. I will see him back in my clinic as needed but he may certainly return or call at anytime with persistent or worsening symptoms and with any questions or concerns. This note is created with the assistance of a speech recognition program.  While intending to generate adocument that actually reflects the content of the visit, the document can still have some errors including those of syntax and sound a like substitutions which may escape proof reading. It such instances, actual meaningcan be extrapolated by contextual diversion.     NA = Not assessed  RTC = Rotator cuff  RCT = Rotator cuff tear  ER = External rotation  IR = Internal rotation  AC = Acromioclavicular  GH = Glenohumeral  n = No  y = Yes

## 2023-02-24 ENCOUNTER — HOSPITAL ENCOUNTER (OUTPATIENT)
Dept: PHYSICAL THERAPY | Facility: CLINIC | Age: 48
Setting detail: THERAPIES SERIES
Discharge: HOME | End: 2023-02-24
Payer: COMMERCIAL

## 2023-02-24 PROCEDURE — 97140 MANUAL THERAPY 1/> REGIONS: CPT

## 2023-02-24 PROCEDURE — 97110 THERAPEUTIC EXERCISES: CPT

## 2023-02-24 PROCEDURE — 97161 PT EVAL LOW COMPLEX 20 MIN: CPT

## 2023-02-24 PROCEDURE — 97016 VASOPNEUMATIC DEVICE THERAPY: CPT

## 2023-02-24 NOTE — CONSULTS
[] [facility]) [facility]  [facility]  [address]  [phone]  F: [phone] [] [address]  [facility]   [address]  P: [phone]  F: [phone] [x] [address] &  Therapy  [address]  P: [phone]  F: [phone] [] [address]  P: [phone]  F: [phone] [] [address]  [facility]   [address]   [place]: [phone]  F: [phone]      Physical Therapy Upper Extremity Evaluation    Date:  2023  Patient: Quinn Hamilton  : 1975  MRN: [id number]  Physician: Dr. Lenora Glasgow: Templeton Petit, visits bmn, ded 3000/173.09 met, 10% co-ins, oop max 6000/2826.91 rem  Medical Diagnosis: L biceps tendonitis  Rehab Codes: M25.512  Onset Date:  2/3/23  Next 's appt:     Subjective:   CC: L shoulder pain  HPI: 2/3/23: work out 4-5x/wk. Got up in am and felt ok. Perfectly fine the day before. Next am at the gym. L ant and superior shoulder with bench press. Did full workout with pain. No incident. Still had strength with pain. + pn with overhead reach. Did past HEP. ~1 wk later, 185-225 lb on bench press. Followed up with Dee 2 wks into it. + pop in am.   Tender to touch at [facility]. Taking voltaren. HEP (prone I-T-Y, ER at 90/90 3x15 w/ 5 lbs, sleeper stretch, ER at 90/90, SL ER, Tried to bench 185 lbs, still sore. Shoulder flexion with 10 lb is painful. Pt has been here twice in the past for rasheeda shoulder issues.      PMHx: [] Unremarkable [] Diabetes [] HTN  [] Pacemaker   [] MI/Heart Problems [] Cancer [] Arthritis [] Other:              [x] Refer to full medical chart  In EPIC       Comorbidities:   [] Obesity [] Dialysis  [] N/A   [] Asthma/COPD [] Dementia [] Other:   [] Stroke [] Sleep apnea [] Other:   [] Vascular disease [] Rheumatic disease [] Other:     Tests: [x] X-Ray: [] MRI:  [] Other:    Medications: [x] Refer to full medical record [] None [] Other:  Allergies:      [x] Refer to full medical record [] None [] Other:    Function:  Hand Dominance  [] Right  [] Left  Employer [employer] manager   Job Status [x]  Normal duty   [] Light duty   [] Off due to condition    []  Retired   [] Not employed   [] Disability  [] Other:  []  Return to work:    Work activities/duties Baseball, weight lifting,        Pain:  [x] Yes  [] No Location: L shoulder Pain Rating: (0-10 scale) 0-1/10 not loaded; worst is up to 6/10 when loaded  Pain altered Tx:  [] Yes  [x] No  Action:    Symptoms:  [x] Improving [] Worsening [] Same  Better:  [] AM    [] PM    [] Sit    [] Rise/Sit    []Stand    [] Walk    [] Lying    [] Other:  Worse: [] AM    [] PM    [] Sit    [] Rise/Sit    []Stand    [] Walk    [] Lying    [] Bend                      [] Valsalva    [x] Other: lifting, quick movements  Sleep: [x] OK    [] Disturbed    Objective:     ROM  °A/P R=L w/ all and wfl END FEEL STRENGTH TESTS (+/-) Left Right Not Tested    Left Right  Left Right Drop Arm -  []         Sulcus Sign -  []         Apprehension   []         Yergasons -  []   Shoulder Flex    5  Speeds +  []   Ext    5  Neer -  []   ABD    5  Gonzales  -  []   ER @ 0 45 90    5  Painful Arc -  []   IR    5  ER lag -  []     OBSERVATION No Deficit Deficit Not Tested Comments   Forward Head [] [] []    Rounded Shoulders [] [] []    Kyphosis [] [] []    Scapular Height/Position [] [x] [] Ant scap tilt, downward rot   Winging [] [] []    Scapulohumeral Rhythm [] [] []    INSPECTION/PALPATION       SC/AC Joint [] [] []    Supraspinatus [] [] []    Biceps tendon/groove [] [] []    Posterior shld [] [] []    Subscapularis [] [] []    NEUROLOGICAL       Cervical ROM/Quadrant [] [] []    Reflexes [] [] []    Compression/Distraction [] [] []    Sensation [] [] [] Functional Test: UEFS Score: 27.5% functionally impaired     Comments:    Assessment:  Patient would benefit from skilled physical therapy services in order to: improve RTC function, decrease pec minor spasm, and educate on proper self treatment to shoulder    Problem list, as detailed above:   [x] ? Pain     [x] ? ROM as he is in ant scap tilt   [x] ? Strength of lower trap and RTC   [x] ? Function: As he cannot lift weights as he wishes  [] ? Balance  [] Edema  [] Postural Deviations  [] Other    STG: (to be met in 4 treatments)  ? Pain:<3/10 at the worst in L shoulder  ? Strength:of RTC and lower trap  ? Function: able to return to lifting weights at least 75% of normal weightss  Patient to be independent with home exercise program as demonstrated by performance with correct form without cues. LTG: (to be met in 8 treatments)  No pain in L shoulder  Minimal to no discomfort with palpation to L pec minor  Able to return to lifting weights at normal level  UEFS >70/80                   Patient goals: To be able to throw a baseball without pain. \"Complete recovery, full ROM and abilty to lift weight with arm, bench curl, tricep, pull up, etc and play baseball\"    Rehab Potential:  [x] Good  [] Fair  [] Poor   Suggested Professional Referral:  [x] No  [] Yes:  Barriers to Goal Achievement:  [x] No  [] Yes:  Domestic Concerns:  [x] No  [] Yes:    Pt. Education:  [x] Plans/Goals, Risks/Benefits discussed  [x] Home exercise program  Method of Education: [x] Verbal    [x] Demo  How to use a lax ball on wall (standing) to release pec minor)  Cleared to lift as long as pain is <3/10  Reviewed how bench press increases stress on RTC    [x] Written via email  Access Code: [email or web address]  URL: [email or web address]  Date: 02/24/2023  Prepared by: Mich Hurtado    Exercises  Sidelying Shoulder ER with Towel and Dumbbell - 2 x daily - 4 x weekly - 2 sets - 10-20 reps  Shoulder External Rotation with Anchored Resistance - 2 x daily - 4 x weekly - 2 sets - 10-20 reps  Prone Shoulder External Rotation with Towel Roll - 2 x daily - 4 x weekly - 2 sets - 10 reps  Prone Shoulder Flexion - 2 x daily - 4 x weekly - 2 sets - 10 reps - 3\" hold  Supine Chest Stretch on Foam Roll - 2 x daily - 4 x weekly - 1 sets - 1 reps - 5 min hold  Prone Single Am Shoulder Horizontal Abduction with Dumbbell - 2 x daily - 4 x weekly - 2 sets - 10 reps    Comprehension of Education:  [] Verbalizes understanding. [] Demonstrates understanding. [x] Needs Review. [] Demonstrates/verbalizes understanding of HEP/Ed previously given.     Treatment Plan:  [x] Therapeutic Exercise   09717  [] Iontophoresis: 4 mg/mL Dexamethasone Sodium Phosphate  mAmin  28591   [x] Therapeutic Activity  00611 [x] Vasopneumatic cold with compression  73053    [] Gait Training   64302 [] Ultrasound   81295   [x] Neuromuscular Re-education  96486 [] Electrical Stimulation Unattended  53377   [x] Manual Therapy  97791 [] Electrical Stimulation Attended  80094   [x] Instruction in HEP  [] Lumbar/Cervical Traction  34060        Frequency: 2 x/week for 8 visits    Todays Treatment:  Modalities:   Treatment Location  Left      Right                          Position   shoulder [x]          []  [] Supine    [] Prone   [] Side lying  [x] Sitting          Treatment Modality   3 Vasocompression    34° temp    Med pressure     15min   1,2 Other:         Precautions:standard  Exercises:  Exercise Reps/ Time Weight/ Level Completed Comments   UBE *             Sidelying       Ext rot *   Towel    Prone       ITY *   Flat bench   ER at 90/90 *   Towel under shoulder   Gym       TB ER *   towel   TB ER at 90/90 *      Corner stretch *             Other:  Manual  DI to pec minor both lateraly and ant/post  T spine mobilizations PA in supine 1 small cavitation    Specific Instructions for next treatment:   Continue annmarie and vaso, no need for additional manual  Finish with game ready      Evaluation Complexity:  History (Personal factors, comorbidities) [x] 0 [] 1-2 [] 3+   Exam (limitations, restrictions) [x] 1-2 [] 3 [] 4+   Clinical presentation (progression) [x] Stable [] Evolving  [] Unstable   Decision Making [x] Low [] Moderate [] High    [x] Low Complexity [] Moderate Complexity [] High Complexity       Treatment Charges: Mins Units   [x] Evaluation       [x]  Low       []  Moderate       []  High  1   []  Modalities     [x]  Ther Exercise 14 1   [x]  Manual Therapy 12 1   []  Ther Activities     []  Aquatics     [x]  Vasocompression 15 1   []  Other       TOTAL TREATMENT TIME:     Time in:  1400   Time Out: 1510    Electronically signed by: Josefa Sams PT        Physician Signature:________________________________Date:__________________  By signing above or cosigning this note, I have reviewed this plan of care and certify a need for medically necessary rehabilitation services.      *PLEASE SIGN ABOVE AND FAX BACK ALL PAGES*

## 2023-02-28 ENCOUNTER — HOSPITAL ENCOUNTER (OUTPATIENT)
Dept: PHYSICAL THERAPY | Facility: CLINIC | Age: 48
Setting detail: THERAPIES SERIES
Discharge: HOME | End: 2023-02-28
Payer: COMMERCIAL

## 2023-02-28 PROCEDURE — 97110 THERAPEUTIC EXERCISES: CPT

## 2023-02-28 PROCEDURE — 97016 VASOPNEUMATIC DEVICE THERAPY: CPT

## 2023-02-28 NOTE — FLOWSHEET NOTE
[x] [facility]  Outpatient Rehabilitation &  Therapy  [address]  P: [phone]  F: [phone]     Physical Therapy Daily Treatment Note    Date:  2023  Patient Name:  Kenan Espinoza    :  1975  MRN: [id number]  Physician: Dr. Suzy Price                                   Insurance: [employer], visits bmn, ded 3000/173.09 met, 10% co-ins, oop max 6000/2826.91 rem  Medical Diagnosis: L biceps tendonitis                    Rehab Codes: M25.512  Onset Date:    2/3/23  Next 's appt:   Visit# / total visits:      Cancels/No Shows: 0    Subjective:    Pain:  [x] Yes  [] No Location: L biceps tendonitis Pain Rating: (0-10 scale) 1/10  Pain altered Tx:  [x] No  [] Yes  Action:  Comments: Pt mentioned that if he rests his arm feels better but pain as soon as put under load.     Objective:  Modalities:   Treatment Location  Left      Right                          Position   shoulder [x]          []  [] Supine    [] Prone   [] Side lying  [x] Sitting                                            Treatment Modality   3 Vasocompression    34° temp    Med pressure     15min   1,2 Other:           Precautions:standard  Exercises:  Exercise Reps/ Time Weight/ Level Comments    Airdyne 10m     x   Corner stretch 30\"x3   x   Bicep stretch  30\"x3 Vertical // bar  x   Sleeper stretch 30\"x3   x          Supine:       Serratus punch 30x  90° and 45° x          Side lying:          Ext rot 30x   Towel  x   Abduction  30x   x   HAB 30x   x          Prone          ITY 20x   Flat bench x   Scapular retraction 20x3\"   x          Gym          TB ER *   towel    TB ER at 90/90 *                                 Instructions for subsequent visits:  Access Code: [email or web address]  URL: [email or web address]  Date: 2023  Prepared by: Tuan Salazar    Exercises  Supine Scapular Protraction in Flexion with Dumbbells - 1 x daily - 5 x weekly - 3 sets - 10 reps  Sidelying Shoulder ER with  Towel and Dumbbell - 1 x daily - 5 x weekly - 3 sets - 10 reps  Sidelying Shoulder Abduction - 1 x daily - 5 x weekly - 3 sets - 10 reps  Sidelying Shoulder Horizontal Abduction - 1 x daily - 5 x weekly - 3 sets - 10 reps  Prone Scapular Slide with Shoulder Extension - 1 x daily - 5 x weekly - 3 sets - 10 reps  Prone Scapular Retraction Arms at Side - 1 x daily - 5 x weekly - 3 sets - 10 reps  Prone Scapular Retraction Y - 1 x daily - 5 x weekly - 3 sets - 10 reps  Doorway Pec Stretch at 90 Degrees Abduction - 1 x daily - 7 x weekly - 3 sets - 30 hold  Doorway Pec Stretch at 120 Degrees Abduction - 1 x daily - 7 x weekly - 3 sets - 30 hold  Sleeper Stretch - 1 x daily - 7 x weekly - 3 sets - 30 hold  Standing Bicep Stretch at Wall - 1 x daily - 7 x weekly - 3 sets - 30 hold      Treatment Charges: Mins Units   []  Modalities     [x]  Ther Exercise 40 3   []  Manual Therapy     []  Ther Activities     []  Aquatics     [x]  Vasocompression 15 1   []  Other     Total Treatment time 55 4       Assessment: [x] Progressing toward goals. [] No change. [x] Other: Time spent today on history of shoulder issues and education on lifting techniques and form. Added in bicep and sleeper stretch to program. Focused on RTC and mid back stability work today with no weight or resistance and more focus on mm activation and mechanics. Pt was fatigued at end. Provided pt with updated HEP and finished with vaso. STG: (to be met in 4 treatments)  ? Pain:<3/10 at the worst in L shoulder  ? Strength:of RTC and lower trap  ? Function: able to return to lifting weights at least 75% of normal weightss  Patient to be independent with home exercise program as demonstrated by performance with correct form without cues. LTG: (to be met in 8 treatments)  No pain in L shoulder  Minimal to no discomfort with palpation to L pec minor  Able to return to lifting weights at normal level  UEFS >70/80                    Patient goals:   To be able to throw a baseball without pain. \"Complete recovery, full ROM and abilty to lift weight with       Pt. Education:  [x] Yes  [] No  [x] Reviewed Prior HEP/Ed  Method of Education: [x] Verbal  [] Demo  [] Written  Comprehension of Education:  [] Verbalizes understanding. [] Demonstrates understanding. [] Needs review. [x] Demonstrates/verbalizes HEP/Ed previously given. Plan: [x] Continue with current plan of care towards goals.         Time In: 11:00 am            Time Out:  12:02 pm    Electronically signed by:  Ora Silver PTA

## 2023-03-02 ENCOUNTER — HOSPITAL ENCOUNTER (OUTPATIENT)
Dept: PHYSICAL THERAPY | Facility: CLINIC | Age: 48
Setting detail: THERAPIES SERIES
Discharge: HOME | End: 2023-03-02
Payer: COMMERCIAL

## 2023-03-02 DIAGNOSIS — M19.012 OSTEOARTHRITIS OF LEFT AC (ACROMIOCLAVICULAR) JOINT: ICD-10-CM

## 2023-03-02 DIAGNOSIS — M75.22 BICEPS TENDINITIS OF LEFT SHOULDER: ICD-10-CM

## 2023-03-02 PROCEDURE — 97110 THERAPEUTIC EXERCISES: CPT

## 2023-03-02 PROCEDURE — 97140 MANUAL THERAPY 1/> REGIONS: CPT

## 2023-03-02 RX ORDER — DICLOFENAC SODIUM 75 MG/1
75 TABLET, DELAYED RELEASE ORAL 2 TIMES DAILY WITH MEALS
Qty: 28 TABLET | Refills: 0 | Status: SHIPPED | OUTPATIENT
Start: 2023-03-02 | End: 2023-03-16

## 2023-03-02 NOTE — FLOWSHEET NOTE
[x] [address]    Outpatient Rehabilitation &  Therapy  [address]  P: [phone]  F: [phone]     Physical Therapy Daily Treatment Note    Date:  3/2/2023  Patient Name:  Mikael Pascal    :  1975  MRN: [id number]  Physician: Dr. Damon Minor: Trisha Hernandez, visits bmn, ded 3000/173.09 met, 10% co-ins, oop max 6000/2826.91 rem  Medical Diagnosis: L biceps tendonitis                    Rehab Codes: M25.512  Onset Date:    2/3/23  Next 's appt:   Visit# / total visits: 3/8     Cancels/No Shows: 0    Subjective:    Pain:  [x] Yes  [] No Location: L biceps tendonitis Pain Rating: (0-10 scale) 1/10  Pain altered Tx:  [x] No  [] Yes  Action:  Comments: Pt stated that he has been working on his HEP a few times a day. Objective:  Modalities:   Treatment Location  Left      Right                          Position   shoulder [x]          []  [] Supine    [] Prone   [] Side lying  [x] Sitting                                            Treatment Modality   3 Vasocompression    34° temp    Med pressure     15min   1,2 Other:           Precautions:standard  Exercises:  Exercise Reps/ Time Weight/ Level Comments    Airdyne 10m     x   Corner stretch 30\"x3   x   Bicep stretch  30\"x3 Vertical // bar  x   Sleeper stretch 30\"x3   x          Wall slides 10x10\"  Flexion,  x          Supine:       Serratus punch 30x  90° and 45° x          Side lying:          Ext rot 30x   Towel  x   Abduction  30x   x   HAB 30x   x          Prone          ITY 20x   Flat bench x   Scapular retraction 20x3\"   x          Gym          TB ER *   towel    TB ER at 90/90 *                                 Instructions for subsequent visits:  Access Code: [facility]  URL: [email or web address]  Date: 2023  Prepared by: Ethelle Mohs    Exercises  Supine Scapular Protraction in Flexion with Dumbbells - 1 x daily - 5 x weekly - 3 sets - 10 reps  Sidelying Shoulder ER with Towel and Dumbbell - 1 x daily - 5 x weekly - 3 sets - 10 reps  Sidelying Shoulder Abduction - 1 x daily - 5 x weekly - 3 sets - 10 reps  Sidelying Shoulder Horizontal Abduction - 1 x daily - 5 x weekly - 3 sets - 10 reps  Prone Scapular Slide with Shoulder Extension - 1 x daily - 5 x weekly - 3 sets - 10 reps  Prone Scapular Retraction Arms at Side - 1 x daily - 5 x weekly - 3 sets - 10 reps  Prone Scapular Retraction Y - 1 x daily - 5 x weekly - 3 sets - 10 reps  Doorway Pec Stretch at 90 Degrees Abduction - 1 x daily - 7 x weekly - 3 sets - 30 hold  Doorway Pec Stretch at 120 Degrees Abduction - 1 x daily - 7 x weekly - 3 sets - 30 hold  Sleeper Stretch - 1 x daily - 7 x weekly - 3 sets - 30 hold  Standing Bicep Stretch at Wall - 1 x daily - 7 x weekly - 3 sets - 30 hold      Treatment Charges: Mins Units   []  Modalities     [x]  Ther Exercise 43 3   [x]  Manual Therapy 10 1   []  Ther Activities     []  Aquatics     []  Vasocompression      []  Other     Total Treatment time 53 4       Assessment: [x] Progressing toward goals. [] No change. [x] Other: Pt continued with warm up and stretches. Added in wall slides for gentle AAROM motion in which pt was feeling a catch at 90° both ascending and descending. Added in passive shoulder abduction with inferior glide, shoulder distraction, and prolonged shoulder flexion stretch all performed in supine. Pt with less discomfort with wall slides after. Continued with exercises after. STG: (to be met in 4 treatments)  ? Pain:<3/10 at the worst in L shoulder  ? Strength:of RTC and lower trap  ? Function: able to return to lifting weights at least 75% of normal weightss  Patient to be independent with home exercise program as demonstrated by performance with correct form without cues.      LTG: (to be met in 8 treatments)  No pain in L shoulder  Minimal to no discomfort with palpation to L pec minor  Able to return to lifting weights at normal level  UEFS >70/80                    Patient goals: To be able to throw a baseball without pain. \"Complete recovery, full ROM and abilty to lift weight with       Pt. Education:  [x] Yes  [] No  [x] Reviewed Prior HEP/Ed  Method of Education: [x] Verbal  [] Demo  [] Written  Comprehension of Education:  [] Verbalizes understanding. [] Demonstrates understanding. [] Needs review. [x] Demonstrates/verbalizes HEP/Ed previously given. Plan: [x] Continue with current plan of care towards goals.         Time In: 11:00 am            Time Out:  12:00 pm    Electronically signed by:  Kishor Fajardo PTA

## 2023-03-03 ENCOUNTER — HOSPITAL ENCOUNTER (OUTPATIENT)
Age: 48
Setting detail: SPECIMEN
Discharge: HOME | End: 2023-03-03

## 2023-03-03 DIAGNOSIS — E78.5 DYSLIPIDEMIA: ICD-10-CM

## 2023-03-03 LAB
CHOLEST SERPL-MCNC: 240 MG/DL
CHOLESTEROL/HDL RATIO: 5
HDLC SERPL-MCNC: 48 MG/DL
LDLC SERPL CALC-MCNC: 170 MG/DL (ref 0–130)
TRIGL SERPL-MCNC: 108 MG/DL

## 2023-03-06 DIAGNOSIS — E78.5 DYSLIPIDEMIA: Primary | ICD-10-CM

## 2023-03-10 ENCOUNTER — HOSPITAL ENCOUNTER (OUTPATIENT)
Dept: PHYSICAL THERAPY | Facility: CLINIC | Age: 48
Setting detail: THERAPIES SERIES
Discharge: HOME | End: 2023-03-10
Payer: COMMERCIAL

## 2023-03-10 PROCEDURE — 97110 THERAPEUTIC EXERCISES: CPT

## 2023-03-10 PROCEDURE — 97016 VASOPNEUMATIC DEVICE THERAPY: CPT

## 2023-03-10 NOTE — FLOWSHEET NOTE
[x] [address]    Outpatient Rehabilitation &  Therapy  [address]  P: [phone]  F: [phone]     Physical Therapy Daily Treatment Note    Date:  3/10/2023  Patient Name:  Katrine Saint    :  1975  MRN: [id number]  Physician: Dr. Melania Schwab: Filipe Heatr, visits bmn, ded 3000/173.09 met, 10% co-ins, oop max 6000/2826.91 rem  Medical Diagnosis: L biceps tendonitis                    Rehab Codes: M25.512  Onset Date:    2/3/23  Next 's appt:   Visit# / total visits:      Cancels/No Shows: 0    Subjective:    Pain:  [] Yes  [x] No Location: L biceps tendonitis Pain Rating: (0-10 scale) 0/10  Pain altered Tx:  [x] No  [] Yes  Action:  Comments: No pain at arrival. Mentions he is still getting pain with overhead movements. Attempted push ups but feels apprehensive. Objective:  Modalities:   Treatment Location  Left      Right                          Position   shoulder [x]          []  [] Supine    [] Prone   [] Side lying  [x] Sitting                                            Treatment Modality   3 Vasocompression    34° temp    Med pressure     15min   1,2 Other:           Precautions:standard  Exercises:  Exercise Reps/ Time Weight/ Level Comments    UBE 10m   5' fwd, 5' retro x   Corner stretch 30\"x3   x   Bicep stretch  30\"x3 Vertical // bar  x   Sleeper stretch 30\"x3   x          Wall slides 10x10\"  Flexion,  x          Supine:       Serratus punch 30x 2# 90° and 45° x          Side lying:          Ext rot x20 2# Towel  x   Abduction  x20 2#  x   HAB x20 2#  x          Prone          ITY 20x 2# Flat bench x   Scapular retraction 20x3\"   -          Gym          TB ER 2x10   towel x   TB ER at 90/90 *                                 Instructions for subsequent visits:  Access Code: [facility]  URL: [email or web address]  Date: 2023  Prepared by: Neto Salmeron    Exercises  Supine Scapular Protraction in Flexion with Dumbbells - 1 x daily - 5 x weekly - 3 sets - 10 reps  Sidelying Shoulder ER with Towel and Dumbbell - 1 x daily - 5 x weekly - 3 sets - 10 reps  Sidelying Shoulder Abduction - 1 x daily - 5 x weekly - 3 sets - 10 reps  Sidelying Shoulder Horizontal Abduction - 1 x daily - 5 x weekly - 3 sets - 10 reps  Prone Scapular Slide with Shoulder Extension - 1 x daily - 5 x weekly - 3 sets - 10 reps  Prone Scapular Retraction Arms at Side - 1 x daily - 5 x weekly - 3 sets - 10 reps  Prone Scapular Retraction Y - 1 x daily - 5 x weekly - 3 sets - 10 reps  Doorway Pec Stretch at 90 Degrees Abduction - 1 x daily - 7 x weekly - 3 sets - 30 hold  Doorway Pec Stretch at 120 Degrees Abduction - 1 x daily - 7 x weekly - 3 sets - 30 hold  Sleeper Stretch - 1 x daily - 7 x weekly - 3 sets - 30 hold  Standing Bicep Stretch at Wall - 1 x daily - 7 x weekly - 3 sets - 30 hold      Treatment Charges: Mins Units   []  Modalities     [x]  Ther Exercise 40 3   [x]  Manual Therapy 5 nc   []  Ther Activities     []  Aquatics     [x]  Vasocompression  10 1   []  Other     Total Treatment time 55 4       Assessment: [x] Progressing toward goals. UBE warm up followed by gentle joint distraction and pec minor release. Cont with stretches as previous. He does report increased pain up to 1/10 with end range flexion on wall slides. Progressed his strengthening with small dumbbell to prone and mat ex. Min cues for technique. Ended treatment with vaso per pt request for soreness relief    [] No change. [x] Other:     STG: (to be met in 4 treatments)  ? Pain:<3/10 at the worst in L shoulder  ? Strength:of RTC and lower trap  ? Function: able to return to lifting weights at least 75% of normal weightss  Patient to be independent with home exercise program as demonstrated by performance with correct form without cues.      LTG: (to be met in 8 treatments)  No pain in L shoulder  Minimal to no discomfort with palpation to L pec minor  Able to return to lifting weights at normal level  UEFS >70/80                    Patient goals: To be able to throw a baseball without pain. \"Complete recovery, full ROM and abilty to lift weight with       Pt. Education:  [x] Yes  [] No  [x] Reviewed Prior HEP/Ed  Method of Education: [x] Verbal  [] Demo  [] Written  Comprehension of Education:  [] Verbalizes understanding. [] Demonstrates understanding. [] Needs review. [x] Demonstrates/verbalizes HEP/Ed previously given. Plan: [x] Continue with current plan of care towards goals.         Time In: 1:00 pm           Time Out:  2:05 pm    Electronically signed by:  Jsaon Tesfaye PTA

## 2023-03-20 ENCOUNTER — HOSPITAL ENCOUNTER (OUTPATIENT)
Dept: PHYSICAL THERAPY | Facility: CLINIC | Age: 48
Setting detail: THERAPIES SERIES
Discharge: HOME | End: 2023-03-20
Payer: COMMERCIAL

## 2023-03-20 PROCEDURE — 97110 THERAPEUTIC EXERCISES: CPT

## 2023-03-20 NOTE — FLOWSHEET NOTE
Shoulder Abduction - 1 x daily - 5 x weekly - 3 sets - 10 reps  Sidelying Shoulder Horizontal Abduction - 1 x daily - 5 x weekly - 3 sets - 10 reps  Prone Scapular Slide with Shoulder Extension - 1 x daily - 5 x weekly - 3 sets - 10 reps  Prone Scapular Retraction Arms at Side - 1 x daily - 5 x weekly - 3 sets - 10 reps  Prone Scapular Retraction Y - 1 x daily - 5 x weekly - 3 sets - 10 reps  Doorway Pec Stretch at 90 Degrees Abduction - 1 x daily - 7 x weekly - 3 sets - 30 hold  Doorway Pec Stretch at 120 Degrees Abduction - 1 x daily - 7 x weekly - 3 sets - 30 hold  Sleeper Stretch - 1 x daily - 7 x weekly - 3 sets - 30 hold  Standing Bicep Stretch at Wall - 1 x daily - 7 x weekly - 3 sets - 30 hold      Treatment Charges: Mins Units   []  Modalities     [x]  Ther Exercise 40 3   []  Manual Therapy      []  Ther Activities     []  Aquatics     [x]  Vasocompression     []  Other     Total Treatment time 40 3       Assessment: [x] Progressing toward goals. [] No change. [x] Other: Pt able to continue working on surrounding stability mm to aide in supporting structure. Educated pt on rest break from normal work out routine to all tendonitis a chance to heal. Pt is to call and schedule a follow up visit with doctor to discuss next steps after PT.     STG: (to be met in 4 treatments)  ? Pain:<3/10 at the worst in L shoulder  ? Strength:of RTC and lower trap  ? Function: able to return to lifting weights at least 75% of normal weightss  Patient to be independent with home exercise program as demonstrated by performance with correct form without cues. LTG: (to be met in 8 treatments)  No pain in L shoulder  Minimal to no discomfort with palpation to L pec minor  Able to return to lifting weights at normal level  UEFS >70/80                    Patient goals: To be able to throw a baseball without pain. \"Complete recovery, full ROM and abilty to lift weight with       Pt.  Education:  [x] Yes  [] No  [x]

## 2023-03-22 ENCOUNTER — HOSPITAL ENCOUNTER (OUTPATIENT)
Dept: PHYSICAL THERAPY | Facility: CLINIC | Age: 48
Setting detail: THERAPIES SERIES
End: 2023-03-22
Payer: COMMERCIAL

## 2023-03-24 ENCOUNTER — HOSPITAL ENCOUNTER (OUTPATIENT)
Dept: PHYSICAL THERAPY | Facility: CLINIC | Age: 48
Setting detail: THERAPIES SERIES
End: 2023-03-24
Payer: COMMERCIAL

## 2023-03-27 ENCOUNTER — APPOINTMENT (OUTPATIENT)
Dept: PHYSICAL THERAPY | Facility: CLINIC | Age: 48
End: 2023-03-27
Payer: COMMERCIAL

## 2023-03-27 ENCOUNTER — OFFICE VISIT (OUTPATIENT)
Dept: ORTHOPEDIC SURGERY | Age: 48
End: 2023-03-27
Payer: COMMERCIAL

## 2023-03-27 VITALS — WEIGHT: 178 LBS | BODY MASS INDEX: 26.36 KG/M2 | RESPIRATION RATE: 16 BRPM | HEIGHT: 69 IN

## 2023-03-27 DIAGNOSIS — M25.512 LEFT SHOULDER PAIN, UNSPECIFIED CHRONICITY: ICD-10-CM

## 2023-03-27 DIAGNOSIS — M19.019 OSTEOARTHRITIS OF AC (ACROMIOCLAVICULAR) JOINT: Primary | ICD-10-CM

## 2023-03-27 DIAGNOSIS — M75.22 BICEPS TENDINITIS OF LEFT SHOULDER: ICD-10-CM

## 2023-03-27 PROCEDURE — 99214 OFFICE O/P EST MOD 30 MIN: CPT | Performed by: ORTHOPAEDIC SURGERY

## 2023-03-27 NOTE — PROGRESS NOTES
HPI: Mr. Abimael Aviles is a 61-year-old here today to have a discussion about his left shoulder. He has been seen in the past for this issue end and diagnosed with an acromioclavicular joint arthrosis arthritis in addition to biceps tendinitis. He has been treated with use of Voltaren as well as physical therapy. He states that the Voltaren worked very well for him but as soon as he was done with that his pain returned to baseline. We had a discussion about options moving forward including continued conservative management and surgical intervention. We also had a discussion about the value of diagnostic injections and how this can inform our decisions regarding surgery. Following an in-depth discussion about all of this he would like to try moving ahead with ultrasound-guided cortisone injection starting with an injection to the acromioclavicular joint. He was referred to Dr. Ritchie Romero for this. If that should fail I would recommend an ultrasound-guided injection into the biceps tendon sheath. He was instructed to pay attention to how much pain he receives from the injections irregardless of how long the effects of the injections last.  Ultimately if either or both worked very well for him and his pain returns he would likely be a candidate for a distal clavicle excision and/or biceps tenodesis. I also ordered an MRI study of his shoulder today to assess for any structural damage.

## 2023-03-31 ENCOUNTER — HOSPITAL ENCOUNTER (OUTPATIENT)
Dept: PHYSICAL THERAPY | Facility: CLINIC | Age: 48
Setting detail: THERAPIES SERIES
Discharge: HOME | End: 2023-03-31
Payer: COMMERCIAL

## 2023-03-31 PROCEDURE — 97140 MANUAL THERAPY 1/> REGIONS: CPT

## 2023-03-31 PROCEDURE — 97110 THERAPEUTIC EXERCISES: CPT

## 2023-03-31 PROCEDURE — 97530 THERAPEUTIC ACTIVITIES: CPT

## 2023-03-31 NOTE — FLOWSHEET NOTE
[x] [address]   Outpatient Rehabilitation &  Therapy  [address]  P: [phone]  F: [phone]     Physical Therapy Daily Treatment Note    Date:  3/31/2023  Patient Name:  Gale Verde     :  1975  MRN: [id number]  Physician: Dr. Terry Model: Sharon Arroyo, visits bmn, ded 3000/173.09 met, 10% co-ins, oop max 6000/2826.91 rem  Medical Diagnosis: L biceps tendonitis                    Rehab Codes: M25.512  Onset Date:    2/3/23       Next 's appt: PRN  Visit# / total visits:     Cancels/No Shows: 0    Subjective:    Pain:  [x] Yes  [] No Location: L biceps tendonitis Pain Rating: (0-10 scale) 1/10  Pain altered Tx:  [x] No  [] Yes  Action:  Comments: Pt rates the shoulder at [facility] the same. \"  Any load causes pain. \"   He has a MRI scheduled  and will schedule an UGSI after that. Full bench press motion still hurts. Some discomfort with HEP. Threw a baseball 20-25x without pain. He states he doesn't feel it is a RTC proper thing. \"  Snapping occurs with OH reach    Objective:  Modalities:   Treatment Location  Left      Right                          Position   shoulder [x]          []  [] Supine    [] Prone   [] Side lying  [x] Sitting                                            Treatment Modality    Vasocompression    34° temp    Med pressure     15min   1,2 Other:           Precautions:standard  Exercises:  Exercise Reps/ Time Weight/ Level Comments           Side lying:          Ext rot 30x 2# Towel     Abduction  30x 2#     HAB 30x 2#            Prone          ITY 30x 2# Flat bench           Gym          Serratus push up 20x  Plinth     Y on wall  20 lime Issued lime band.                           Manual  DI to L pec major and minor    Instructions for subsequent visits:    Treatment Charges: Mins Units   []  Modalities     [x]  Ther Exercise 13 1   [x]  Manual Therapy 15 1   [x]  Ther Activities 33 2   []  Aquatics

## 2023-04-21 ENCOUNTER — HOSPITAL ENCOUNTER (OUTPATIENT)
Dept: PHYSICAL THERAPY | Facility: CLINIC | Age: 48
Setting detail: THERAPIES SERIES
Discharge: HOME | End: 2023-04-21
Payer: COMMERCIAL

## 2023-04-21 PROCEDURE — 97530 THERAPEUTIC ACTIVITIES: CPT

## 2023-04-21 PROCEDURE — 97110 THERAPEUTIC EXERCISES: CPT

## 2023-04-21 NOTE — FLOWSHEET NOTE
[x] [address]   Outpatient Rehabilitation &  Therapy  [address]  P: [phone]  F: [phone]     Physical Therapy Daily Treatment Note    Date:  2023  Patient Name:  Dean Vogel     :  1975  MRN: [id number]  Physician: Dr. Benjamin Palm: Berenice Dietz, visits bmn, ded 3000/173.09 met, 10% co-ins, oop max 6000/2826.91 rem  Medical Diagnosis: L biceps tendonitis                    Rehab Codes: M25.512  Onset Date:    2/3/23       Next 's appt: PRN  Visit# / total visits:     Cancels/No Shows: 0    Subjective:    Pain:  [x] Yes  [] No Location: L biceps tendonitis Pain Rating: (0-10 scale) 1/10  Pain altered Tx:  [x] No  [] Yes  Action:  Comments: He received the injection and \"all better\". Occasional tightness in UT region. Objective:  Modalities:   Treatment Location  Left      Right                          Position   shoulder [x]          []  [] Supine    [] Prone   [] Side lying  [x] Sitting                                            Treatment Modality    Vasocompression    34° temp    Med pressure     15min   1,2 Other:           Precautions:standard  Exercises:  Exercise Reps/ Time Weight/ Level Comments           Side lying:          Ext rot 30x 2# Towel     Abduction  30x 2#     HAB 30x 2#            Prone          ITY 30x 2# Flat bench           Gym          Serratus push up 20x  Plinth     Y on wall  20 lime Issued lime band. Manual  DI to L pec major and minor    Instructions for subsequent visits:    Treatment Charges: Mins Units   []  Modalities     [x]  Ther Exercise 13 1   []  Manual Therapy     [x]  Ther Activities 10 1   []  Aquatics     []  Vasocompression     []  Other     Total Treatment time 23 2       Assessment: [x] Progressing toward goals. Pt's HEP was modified as requested. Posture remains an issue and provided cues to improve.    He sits w/ significant T kyphosis,

## 2023-05-15 NOTE — ASSESSMENT & PLAN NOTE
Resolved - discussed risk factors, aggravating factors, and OTC medications to try if reoccurs Lot # For Kenalog (Optional): [id number] Lot # (Optional): [id number]

## 2023-06-29 ENCOUNTER — OFFICE VISIT (OUTPATIENT)
Dept: FAMILY MEDICINE CLINIC | Age: 48
End: 2023-06-29
Payer: COMMERCIAL

## 2023-06-29 VITALS
OXYGEN SATURATION: 97 % | DIASTOLIC BLOOD PRESSURE: 80 MMHG | WEIGHT: 175 LBS | BODY MASS INDEX: 25.84 KG/M2 | SYSTOLIC BLOOD PRESSURE: 130 MMHG | HEART RATE: 103 BPM

## 2023-06-29 DIAGNOSIS — G89.29 CHRONIC PAIN OF BOTH SHOULDERS: ICD-10-CM

## 2023-06-29 DIAGNOSIS — E78.5 DYSLIPIDEMIA: Primary | ICD-10-CM

## 2023-06-29 DIAGNOSIS — M25.512 CHRONIC PAIN OF BOTH SHOULDERS: ICD-10-CM

## 2023-06-29 DIAGNOSIS — K21.9 GASTROESOPHAGEAL REFLUX DISEASE, UNSPECIFIED WHETHER ESOPHAGITIS PRESENT: ICD-10-CM

## 2023-06-29 DIAGNOSIS — R73.01 IFG (IMPAIRED FASTING GLUCOSE): ICD-10-CM

## 2023-06-29 DIAGNOSIS — M25.511 CHRONIC PAIN OF BOTH SHOULDERS: ICD-10-CM

## 2023-06-29 DIAGNOSIS — R35.1 NOCTURIA: ICD-10-CM

## 2023-06-29 PROCEDURE — 99214 OFFICE O/P EST MOD 30 MIN: CPT | Performed by: FAMILY MEDICINE

## 2023-06-29 RX ORDER — AZITHROMYCIN 250 MG/1
TABLET, FILM COATED ORAL
COMMUNITY
Start: 2023-06-22 | End: 2023-06-29

## 2023-06-29 RX ORDER — PREDNISONE 20 MG/1
20 TABLET ORAL DAILY
COMMUNITY
Start: 2023-06-22 | End: 2023-06-29

## 2023-06-29 SDOH — ECONOMIC STABILITY: FOOD INSECURITY: WITHIN THE PAST 12 MONTHS, YOU WORRIED THAT YOUR FOOD WOULD RUN OUT BEFORE YOU GOT MONEY TO BUY MORE.: NEVER TRUE

## 2023-06-29 SDOH — ECONOMIC STABILITY: INCOME INSECURITY: HOW HARD IS IT FOR YOU TO PAY FOR THE VERY BASICS LIKE FOOD, HOUSING, MEDICAL CARE, AND HEATING?: NOT HARD AT ALL

## 2023-06-29 SDOH — ECONOMIC STABILITY: FOOD INSECURITY: WITHIN THE PAST 12 MONTHS, THE FOOD YOU BOUGHT JUST DIDN'T LAST AND YOU DIDN'T HAVE MONEY TO GET MORE.: NEVER TRUE

## 2023-06-29 SDOH — ECONOMIC STABILITY: HOUSING INSECURITY
IN THE LAST 12 MONTHS, WAS THERE A TIME WHEN YOU DID NOT HAVE A STEADY PLACE TO SLEEP OR SLEPT IN A SHELTER (INCLUDING NOW)?: NO

## 2023-06-29 ASSESSMENT — PATIENT HEALTH QUESTIONNAIRE - PHQ9
SUM OF ALL RESPONSES TO PHQ9 QUESTIONS 1 & 2: 0
SUM OF ALL RESPONSES TO PHQ QUESTIONS 1-9: 0
2. FEELING DOWN, DEPRESSED OR HOPELESS: 0
SUM OF ALL RESPONSES TO PHQ QUESTIONS 1-9: 0
SUM OF ALL RESPONSES TO PHQ QUESTIONS 1-9: 0
1. LITTLE INTEREST OR PLEASURE IN DOING THINGS: 0
SUM OF ALL RESPONSES TO PHQ QUESTIONS 1-9: 0

## 2023-06-30 ASSESSMENT — ENCOUNTER SYMPTOMS
BELCHING: 0
RESPIRATORY NEGATIVE: 1
ABDOMINAL PAIN: 0
GLOBUS SENSATION: 0
NAUSEA: 0
ALLERGIC/IMMUNOLOGIC NEGATIVE: 1
SHORTNESS OF BREATH: 0
CHOKING: 0
EYES NEGATIVE: 1
WATER BRASH: 0
STRIDOR: 0
HEARTBURN: 1
HOARSE VOICE: 0
SORE THROAT: 0
COUGH: 0
WHEEZING: 0

## 2023-08-28 ENCOUNTER — OFFICE VISIT (OUTPATIENT)
Dept: ORTHOPEDIC SURGERY | Age: 48
End: 2023-08-28
Payer: COMMERCIAL

## 2023-08-28 VITALS — WEIGHT: 175 LBS | RESPIRATION RATE: 14 BRPM | HEIGHT: 69 IN | BODY MASS INDEX: 25.92 KG/M2

## 2023-08-28 DIAGNOSIS — M25.511 RIGHT SHOULDER PAIN, UNSPECIFIED CHRONICITY: Primary | ICD-10-CM

## 2023-08-28 PROCEDURE — 99213 OFFICE O/P EST LOW 20 MIN: CPT | Performed by: ORTHOPAEDIC SURGERY

## 2023-08-28 RX ORDER — DICLOFENAC SODIUM 75 MG/1
75 TABLET, DELAYED RELEASE ORAL 2 TIMES DAILY WITH MEALS
Qty: 28 TABLET | Refills: 0 | Status: SHIPPED | OUTPATIENT
Start: 2023-08-28 | End: 2023-09-11

## 2023-08-28 NOTE — PROGRESS NOTES
ORTHOPEDIC PATIENT EVALUATION      HPI / Chief Complaint  Arjun Kang is a 57 y.o. male who presents for evaluation of his right shoulder. He states that about a month ago he dove for baseball and developed pain in that shoulder later on that night. The pain is primarily localized to the anterior aspect of the shoulder. It is exacerbated by use especially working out and rotation with his arm away from his body. He has gradually gotten better over time. He denies having any popping locking or catching in the shoulder. I have seen him in the past for his left shoulder and he was diagnosed with acromioclavicular joint arthrosis versus biceps tendinitis. When he was last seen I did refer him for an ultrasound-guided cortisone injection into the acromioclavicular joint for diagnostic and therapeutic purposes. He did receive the injection earlier on this year in March and states that it worked very well for him. Currently he has started experiencing some popping in the shoulder with activities but his pain is very mild and nowhere near what it was prior to the injection. Past Medical History  Laura Parish  has a past medical history of Hyperlipidemia. Past Surgical History  Laura Parish  has a past surgical history that includes hernia repair (Bilateral, 3/2013); Adenoidectomy; LASIK; and Colonoscopy (N/A, 1/4/2019). Current Medications  No current outpatient medications on file. No current facility-administered medications for this visit. Allergies  Allergies have been reviewed. Laura Parish is allergic to lodine [etodolac] and pcn [penicillins]. Social History  Laura Parish  reports that he has never smoked. He has never used smokeless tobacco. He reports current alcohol use. He reports that he does not use drugs. Family History  Kenan's family history is not on file. Review of Systems   History obtained from the patient.    REVIEW OF SYSTEMS:   Constitution: negative for fever, chills, weight

## 2024-01-02 ENCOUNTER — OFFICE VISIT (OUTPATIENT)
Dept: FAMILY MEDICINE CLINIC | Age: 49
End: 2024-01-02
Payer: COMMERCIAL

## 2024-01-02 VITALS
DIASTOLIC BLOOD PRESSURE: 86 MMHG | HEART RATE: 76 BPM | OXYGEN SATURATION: 99 % | SYSTOLIC BLOOD PRESSURE: 132 MMHG | WEIGHT: 176 LBS | BODY MASS INDEX: 25.99 KG/M2

## 2024-01-02 DIAGNOSIS — E78.5 DYSLIPIDEMIA: ICD-10-CM

## 2024-01-02 DIAGNOSIS — G89.29 CHRONIC PAIN OF BOTH SHOULDERS: ICD-10-CM

## 2024-01-02 DIAGNOSIS — M25.512 CHRONIC PAIN OF BOTH SHOULDERS: ICD-10-CM

## 2024-01-02 DIAGNOSIS — T23.112A SUPERFICIAL BURN OF THUMB OF LEFT HAND, INITIAL ENCOUNTER: ICD-10-CM

## 2024-01-02 DIAGNOSIS — M25.511 CHRONIC PAIN OF BOTH SHOULDERS: ICD-10-CM

## 2024-01-02 DIAGNOSIS — Z12.5 PROSTATE CANCER SCREENING: ICD-10-CM

## 2024-01-02 DIAGNOSIS — J06.9 UPPER RESPIRATORY TRACT INFECTION, UNSPECIFIED TYPE: Primary | ICD-10-CM

## 2024-01-02 DIAGNOSIS — M99.08 SOMATIC DYSFUNCTION OF RIB CAGE REGION: ICD-10-CM

## 2024-01-02 DIAGNOSIS — T23.122A: ICD-10-CM

## 2024-01-02 DIAGNOSIS — R73.01 IFG (IMPAIRED FASTING GLUCOSE): ICD-10-CM

## 2024-01-02 PROCEDURE — 99214 OFFICE O/P EST MOD 30 MIN: CPT | Performed by: FAMILY MEDICINE

## 2024-01-02 RX ORDER — AZITHROMYCIN 250 MG/1
TABLET, FILM COATED ORAL
Qty: 1 PACKET | Refills: 0 | Status: SHIPPED | OUTPATIENT
Start: 2024-01-02 | End: 2024-01-12

## 2024-01-02 ASSESSMENT — ENCOUNTER SYMPTOMS
SHORTNESS OF BREATH: 0
RHINORRHEA: 0
GASTROINTESTINAL NEGATIVE: 1
NAUSEA: 0
BURN: 1
SORE THROAT: 1
ABDOMINAL PAIN: 0
SINUS PAIN: 0
SWOLLEN GLANDS: 0
DIARRHEA: 0
VOMITING: 0
EYES NEGATIVE: 1
WHEEZING: 0
ALLERGIC/IMMUNOLOGIC NEGATIVE: 1
COUGH: 1

## 2024-01-02 NOTE — ASSESSMENT & PLAN NOTE
Unclear control, continue current plan pending work up below and lifestyle modifications recommended

## 2024-01-02 NOTE — PROGRESS NOTES
Tympanic membrane, ear canal and external ear normal. There is no impacted cerumen.      Left Ear: Tympanic membrane, ear canal and external ear normal. There is no impacted cerumen.      Nose: Congestion and rhinorrhea present.      Mouth/Throat:      Mouth: Mucous membranes are moist.      Pharynx: Oropharynx is clear. No oropharyngeal exudate or posterior oropharyngeal erythema.   Eyes:      General: No scleral icterus.        Right eye: No discharge.         Left eye: No discharge.      Conjunctiva/sclera: Conjunctivae normal.   Cardiovascular:      Rate and Rhythm: Normal rate and regular rhythm.      Pulses: Normal pulses.      Heart sounds: Normal heart sounds. No murmur heard.     No friction rub. No gallop.   Pulmonary:      Effort: Pulmonary effort is normal. No respiratory distress.      Breath sounds: Normal breath sounds. No stridor. No wheezing, rhonchi or rales.   Chest:      Chest wall: Deformity (left ribs raised) present. No tenderness.       Musculoskeletal:      Right shoulder: Tenderness present. Decreased range of motion.      Left shoulder: Tenderness present. Decreased range of motion.   Skin:     General: Skin is warm.      Coloration: Skin is not jaundiced or pale.   Neurological:      Mental Status: He is alert and oriented to person, place, and time. Mental status is at baseline.   Psychiatric:         Mood and Affect: Mood normal.         Behavior: Behavior normal.         Thought Content: Thought content normal.         Judgment: Judgment normal.         Labs/Imaging/Diagnostics   Labs:  No results found for: \"CMPWITHGFR\", \"CBCAUTODIF\", \"TSHFT4\", \"LABA1C\", \"LIPIDPAN\"    Imaging Last 24 Hours:  XR SHOULDER RIGHT (MIN 2 VIEWS)  4 x-ray views of the right shoulder completed on 8/28/2023 reviewed   independently demonstrating normal glenohumeral and acromioclavicular   joint spaces.  Type I acromion.  No obvious fracture, dislocation or   subluxation.

## 2024-01-02 NOTE — PATIENT INSTRUCTIONS
A somatic dysfunction in which movement or position of one or several ribs is altered or disrupted.For example, an elevated rib is one held in a position of inhalation such that motion toward inhalation is freer, and motion toward exhalation is restricted. A depressed rib is one held in a position of exhalation such that motion toward exhalation is freer, and there is a restriction in inhalation.

## 2024-02-08 ENCOUNTER — HOSPITAL ENCOUNTER (OUTPATIENT)
Age: 49
Setting detail: SPECIMEN
Discharge: HOME | End: 2024-02-08

## 2024-02-08 DIAGNOSIS — R73.01 IFG (IMPAIRED FASTING GLUCOSE): ICD-10-CM

## 2024-02-08 DIAGNOSIS — N40.1 BENIGN PROSTATIC HYPERPLASIA WITH NOCTURIA: ICD-10-CM

## 2024-02-08 DIAGNOSIS — T23.112A SUPERFICIAL BURN OF THUMB OF LEFT HAND, INITIAL ENCOUNTER: ICD-10-CM

## 2024-02-08 DIAGNOSIS — R35.1 BENIGN PROSTATIC HYPERPLASIA WITH NOCTURIA: ICD-10-CM

## 2024-02-08 DIAGNOSIS — E78.5 DYSLIPIDEMIA: ICD-10-CM

## 2024-02-08 DIAGNOSIS — G89.29 CHRONIC PAIN OF BOTH SHOULDERS: ICD-10-CM

## 2024-02-08 DIAGNOSIS — R35.1 NOCTURIA: Primary | ICD-10-CM

## 2024-02-08 DIAGNOSIS — Z12.5 PROSTATE CANCER SCREENING: ICD-10-CM

## 2024-02-08 DIAGNOSIS — M25.511 CHRONIC PAIN OF BOTH SHOULDERS: ICD-10-CM

## 2024-02-08 DIAGNOSIS — J06.9 UPPER RESPIRATORY TRACT INFECTION, UNSPECIFIED TYPE: ICD-10-CM

## 2024-02-08 DIAGNOSIS — M25.512 CHRONIC PAIN OF BOTH SHOULDERS: ICD-10-CM

## 2024-02-08 DIAGNOSIS — T23.122A: ICD-10-CM

## 2024-02-08 LAB
ALBUMIN SERPL-MCNC: 4.4 G/DL (ref 3.5–5.2)
ALBUMIN/GLOB SERPL: 1.6 {RATIO} (ref 1–2.5)
ALP SERPL-CCNC: 64 U/L (ref 40–129)
ALT SERPL-CCNC: 20 U/L (ref 5–41)
ANION GAP SERPL CALCULATED.3IONS-SCNC: 10 MMOL/L (ref 9–17)
AST SERPL-CCNC: 23 U/L
BASOPHILS # BLD: 0.04 K/UL (ref 0–0.2)
BASOPHILS NFR BLD: 1 % (ref 0–2)
BILIRUB SERPL-MCNC: 0.8 MG/DL (ref 0.3–1.2)
BUN SERPL-MCNC: 17 MG/DL (ref 6–20)
CALCIUM SERPL-MCNC: 9.5 MG/DL (ref 8.6–10.4)
CHLORIDE SERPL-SCNC: 103 MMOL/L (ref 98–107)
CHOLEST SERPL-MCNC: 215 MG/DL
CHOLESTEROL/HDL RATIO: 4.1
CO2 SERPL-SCNC: 26 MMOL/L (ref 20–31)
CREAT SERPL-MCNC: 1 MG/DL (ref 0.7–1.2)
EOSINOPHIL # BLD: 0.1 K/UL (ref 0–0.44)
EOSINOPHILS RELATIVE PERCENT: 2 % (ref 1–4)
ERYTHROCYTE [DISTWIDTH] IN BLOOD BY AUTOMATED COUNT: 12.8 % (ref 11.8–14.4)
EST. AVERAGE GLUCOSE BLD GHB EST-MCNC: 100 MG/DL
GFR SERPL CREATININE-BSD FRML MDRD: >60 ML/MIN/1.73M2
GLUCOSE SERPL-MCNC: 97 MG/DL (ref 70–99)
HBA1C MFR BLD: 5.1 % (ref 4–6)
HCT VFR BLD AUTO: 45.7 % (ref 40.7–50.3)
HDLC SERPL-MCNC: 52 MG/DL
HGB BLD-MCNC: 15.3 G/DL (ref 13–17)
IMM GRANULOCYTES # BLD AUTO: <0.03 K/UL (ref 0–0.3)
IMM GRANULOCYTES NFR BLD: 0 %
LDLC SERPL CALC-MCNC: 149 MG/DL (ref 0–130)
LYMPHOCYTES NFR BLD: 2.12 K/UL (ref 1.1–3.7)
LYMPHOCYTES RELATIVE PERCENT: 42 % (ref 24–43)
MCH RBC QN AUTO: 29.9 PG (ref 25.2–33.5)
MCHC RBC AUTO-ENTMCNC: 33.5 G/DL (ref 28.4–34.8)
MCV RBC AUTO: 89.3 FL (ref 82.6–102.9)
MONOCYTES NFR BLD: 0.5 K/UL (ref 0.1–1.2)
MONOCYTES NFR BLD: 10 % (ref 3–12)
NEUTROPHILS NFR BLD: 45 % (ref 36–65)
NEUTS SEG NFR BLD: 2.27 K/UL (ref 1.5–8.1)
NRBC BLD-RTO: 0 PER 100 WBC
PLATELET # BLD AUTO: 258 K/UL (ref 138–453)
PMV BLD AUTO: 10.8 FL (ref 8.1–13.5)
POTASSIUM SERPL-SCNC: 4.3 MMOL/L (ref 3.7–5.3)
PROT SERPL-MCNC: 7.1 G/DL (ref 6.4–8.3)
PSA SERPL-MCNC: 3.7 NG/ML (ref 0–4)
RBC # BLD AUTO: 5.12 M/UL (ref 4.21–5.77)
SODIUM SERPL-SCNC: 139 MMOL/L (ref 135–144)
TRIGL SERPL-MCNC: 69 MG/DL
WBC OTHER # BLD: 5 K/UL (ref 3.5–11.3)

## 2024-04-19 ENCOUNTER — HOSPITAL ENCOUNTER (OUTPATIENT)
Age: 49
Setting detail: SPECIMEN
Discharge: HOME | End: 2024-04-19

## 2024-04-19 DIAGNOSIS — R35.1 BENIGN PROSTATIC HYPERPLASIA WITH NOCTURIA: ICD-10-CM

## 2024-04-19 DIAGNOSIS — N40.1 BENIGN PROSTATIC HYPERPLASIA WITH NOCTURIA: ICD-10-CM

## 2024-04-19 LAB — PSA SERPL-MCNC: 0.8 NG/ML (ref 0–4)

## 2024-05-03 ENCOUNTER — OFFICE VISIT (OUTPATIENT)
Dept: FAMILY MEDICINE CLINIC | Age: 49
End: 2024-05-03
Payer: COMMERCIAL

## 2024-05-03 VITALS
HEART RATE: 77 BPM | SYSTOLIC BLOOD PRESSURE: 122 MMHG | WEIGHT: 173 LBS | BODY MASS INDEX: 25.55 KG/M2 | DIASTOLIC BLOOD PRESSURE: 78 MMHG | OXYGEN SATURATION: 98 %

## 2024-05-03 DIAGNOSIS — Z00.00 ENCOUNTER FOR WELL ADULT EXAM WITHOUT ABNORMAL FINDINGS: Primary | ICD-10-CM

## 2024-05-03 DIAGNOSIS — E78.5 DYSLIPIDEMIA: ICD-10-CM

## 2024-05-03 DIAGNOSIS — R97.20 ELEVATED PSA: ICD-10-CM

## 2024-05-03 DIAGNOSIS — K21.9 GASTROESOPHAGEAL REFLUX DISEASE, UNSPECIFIED WHETHER ESOPHAGITIS PRESENT: ICD-10-CM

## 2024-05-03 DIAGNOSIS — R73.01 IFG (IMPAIRED FASTING GLUCOSE): ICD-10-CM

## 2024-05-03 PROCEDURE — 99396 PREV VISIT EST AGE 40-64: CPT | Performed by: FAMILY MEDICINE

## 2024-05-03 NOTE — PROGRESS NOTES
Well Adult Note  Name: Kenan Espinoza Today’s Date: 5/3/2024   MRN: [id number] Sex: Male   Age: 49 y.o. Ethnicity: Non- / Non    : 1975 Race: White (non-)      Kenan Espinoza is here for well adult exam.  History:  Kenan Espinoza presents today for an annual physical.    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 no Hyperlipidemia? Yes but not on medicines  Hearing - normal to conversation  Vision - No Problems negative  Tobacco/Smoking - Smoker? non-smoker Vape? No Smokeless? No   Alcohol - social drinker  Illicit Drugs - no history of illicit drug use  Skin Cancer - Fair skin/previous sun exposure? No Suspicious lesion? None - gets checked at [facility] periodically  Depression - denies  Anxiety - denies      Review of Systems   Constitutional: Negative.    HENT: Negative.     Eyes: Negative.    Respiratory: Negative.     Cardiovascular: Negative.    Gastrointestinal: Negative.    Endocrine: Negative.    Genitourinary: Negative.    Musculoskeletal: Negative.    Skin: Negative.    Allergic/Immunologic: Negative.    Neurological: Negative.    Hematological: Negative.    Psychiatric/Behavioral: Negative.     All other systems reviewed and are negative.      Allergies   Allergen Reactions    Lodine [Etodolac]     Pcn [Penicillins]          Prior to Visit Medications    Not on File         Past Medical History:   Diagnosis Date    Hyperlipidemia     elevated        Past Surgical History:   Procedure Laterality Date    ADENOIDECTOMY      COLONOSCOPY N/A 2019    COLONOSCOPY

## 2024-05-06 ASSESSMENT — ENCOUNTER SYMPTOMS
EYES NEGATIVE: 1
RESPIRATORY NEGATIVE: 1
ALLERGIC/IMMUNOLOGIC NEGATIVE: 1
GASTROINTESTINAL NEGATIVE: 1

## 2025-01-08 ENCOUNTER — OFFICE VISIT (OUTPATIENT)
Dept: ORTHOPEDIC SURGERY | Age: 50
End: 2025-01-08
Payer: COMMERCIAL

## 2025-01-08 VITALS — RESPIRATION RATE: 14 BRPM | WEIGHT: 177 LBS | BODY MASS INDEX: 26.22 KG/M2 | HEIGHT: 69 IN

## 2025-01-08 DIAGNOSIS — M77.12 LATERAL EPICONDYLITIS OF LEFT ELBOW: Primary | ICD-10-CM

## 2025-01-08 DIAGNOSIS — M25.522 LEFT ELBOW PAIN: ICD-10-CM

## 2025-01-08 DIAGNOSIS — M19.011 ARTHROSIS OF RIGHT ACROMIOCLAVICULAR JOINT: Primary | ICD-10-CM

## 2025-01-08 DIAGNOSIS — M19.011 ARTHROSIS OF RIGHT ACROMIOCLAVICULAR JOINT: ICD-10-CM

## 2025-01-08 DIAGNOSIS — M77.12 LATERAL EPICONDYLITIS OF LEFT ELBOW: ICD-10-CM

## 2025-01-08 PROCEDURE — 99213 OFFICE O/P EST LOW 20 MIN: CPT | Performed by: ORTHOPAEDIC SURGERY

## 2025-01-08 RX ORDER — DICLOFENAC SODIUM 75 MG/1
75 TABLET, DELAYED RELEASE ORAL 2 TIMES DAILY WITH MEALS
Qty: 28 TABLET | Refills: 0 | Status: SHIPPED | OUTPATIENT
Start: 2025-01-08 | End: 2025-01-22

## 2025-01-08 NOTE — PROGRESS NOTES
ORTHOPEDIC PATIENT EVALUATION      HPI / Chief Complaint  Kenan Espinoza is a 49 y.o. male who presents for reevaluation of his right shoulder and left elbow.  He has been seen in the past for both issues.  He states that with regards to the shoulder he was doing well up until a few months ago when he started having pain localized to the anterior and superior aspects of his shoulder.  This is fairly tolerable but typically present with use and activities.  With regards to the left elbow he has been seen previously and diagnosed with a lateral epicondylitis.  Once again he states that his pain is tolerable but when present is localized to the lateral aspect the elbow associated with use.  No weakness or paresthesias.    Past Medical History  Kenan  has a past medical history of Hyperlipidemia.    Past Surgical History  Kenan  has a past surgical history that includes hernia repair (Bilateral, 3/2013); Adenoidectomy; LASIK; and Colonoscopy (N/A, 1/4/2019).    Current Medications  Current Outpatient Medications   Medication Sig Dispense Refill    diclofenac (VOLTAREN) 75 MG EC tablet Take 1 tablet by mouth 2 times daily (with meals) for 14 days 28 tablet 0     No current facility-administered medications for this visit.       Allergies  Allergies have been reviewed.  Kenan is allergic to lodine [etodolac] and pcn [penicillins].    Social History  Kenan  reports that he has never smoked. He has never used smokeless tobacco. He reports current alcohol use. He reports that he does not use drugs.    Family History  Kenan's family history is not on file.      Review of Systems   History obtained from the patient.   REVIEW OF SYSTEMS:   Constitution: negative for fever, chills, weight loss or malaise   Musculoskeletal: As noted in the HPI   Neurologic: As noted in the HPI    Physical Exam  Resp 14   Ht 1.753 m (5' 9.02\")   Wt 80.3 kg (177 lb)   BMI 26.13 kg/m²    General Appearance: alert, well appearing, and in

## 2025-01-08 NOTE — TELEPHONE ENCOUNTER
Voltaren 75 mg pended for 1/8/25 office visit for right shoulder AC joint arthrosis and left elbow lateral epicondylitis

## 2025-07-26 ENCOUNTER — OFFICE VISIT (OUTPATIENT)
Age: 50
End: 2025-07-26

## 2025-07-26 VITALS
WEIGHT: 175 LBS | DIASTOLIC BLOOD PRESSURE: 67 MMHG | HEART RATE: 88 BPM | SYSTOLIC BLOOD PRESSURE: 119 MMHG | HEIGHT: 69 IN | TEMPERATURE: 98.2 F | BODY MASS INDEX: 25.92 KG/M2 | OXYGEN SATURATION: 95 %

## 2025-07-26 DIAGNOSIS — H57.89 IRRITATION OF LEFT EYE: Primary | ICD-10-CM

## 2025-07-26 RX ORDER — OFLOXACIN 3 MG/ML
1 SOLUTION/ DROPS OPHTHALMIC 4 TIMES DAILY
Qty: 5 ML | Refills: 0 | Status: SHIPPED | OUTPATIENT
Start: 2025-07-26 | End: 2025-08-05

## 2025-07-26 ASSESSMENT — ENCOUNTER SYMPTOMS
TROUBLE SWALLOWING: 0
NAUSEA: 0
COUGH: 0
SORE THROAT: 0
EYE REDNESS: 0
BACK PAIN: 0
SHORTNESS OF BREATH: 0
ABDOMINAL PAIN: 0
EYE PAIN: 0
VOICE CHANGE: 0
FACIAL SWELLING: 0
COLOR CHANGE: 0
CHEST TIGHTNESS: 0
ABDOMINAL DISTENTION: 0
EYE DISCHARGE: 0
CONSTIPATION: 0
VOMITING: 0
DIARRHEA: 0

## 2025-07-26 NOTE — PROGRESS NOTES
[facility] [place] [address]  Phone: [phone]  Fax: [phone]      Kenan Espinoza  1975  MRN: [id number]  Date of visit: 7/26/2025      Chief complaint(s): Foreign Body in Eye (Pt states he has potential foreign body in his left eye. Pt states he was cutting copper wire while he was home and it sprung up when he cut it. Pt would like to make sure that there is nothing in his eye. Pt states that there is minimal to no irritation. )        Foreign Body in Eye  Pertinent negatives include no abdominal pain, chest pain, congestion, cough, fever, hearing loss, sore throat, trouble swallowing or vomiting.       Kenan Espinoza  is a  50 y.o. male  patient presented to the urgent care today  for evaluation of    Possible foreign body in the left eye.  He said he was cutting copper wire while he was home and sprung up when he got it he felt something got on his eye show make sure there is nothing in his eye.  He reports some irritation of his left eye.  No vision changes.  No double or blurred vision.             PAST MEDICAL HISTORY    Past Medical History:   Diagnosis Date    Hyperlipidemia     elevated        SURGICAL HISTORY    Past Surgical History:   Procedure Laterality Date    ADENOIDECTOMY      COLONOSCOPY N/A 1/4/2019    COLONOSCOPY POLYPECTOMY SNARE/COLD BIOPSY performed by Mick Mena MD at [facility] OR    HERNIA REPAIR Bilateral 3/2013    LASIK         CURRENT MEDICATIONS    Current Outpatient Rx   Medication Sig Dispense Refill    ofloxacin (OCUFLOX) 0.3 % solution Place 1 drop into the left eye 4 times daily for 10 days 5 mL 0    diclofenac (VOLTAREN) 75 MG EC tablet Take 1 tablet by mouth 2 times daily (with meals) for 14 days (Patient not taking: Reported on 7/26/2025) 28 tablet 0       ALLERGIES    Allergies   Allergen Reactions    Lodine [Etodolac]     Pcn [Penicillins]        FAMILY HISTORY    No family history on

## 2025-08-13 ENCOUNTER — OFFICE VISIT (OUTPATIENT)
Age: 50
End: 2025-08-13

## 2025-08-13 VITALS
DIASTOLIC BLOOD PRESSURE: 80 MMHG | TEMPERATURE: 98.3 F | SYSTOLIC BLOOD PRESSURE: 120 MMHG | HEIGHT: 69 IN | OXYGEN SATURATION: 97 % | BODY MASS INDEX: 25.92 KG/M2 | WEIGHT: 175 LBS | HEART RATE: 71 BPM | RESPIRATION RATE: 17 BRPM

## 2025-08-13 DIAGNOSIS — S05.02XA ABRASION OF LEFT CONJUNCTIVA, INITIAL ENCOUNTER: Primary | ICD-10-CM

## 2025-08-13 DIAGNOSIS — H11.422 CHEMOSIS OF LEFT CONJUNCTIVA: ICD-10-CM

## 2025-08-13 RX ORDER — AZELASTINE HYDROCHLORIDE 0.5 MG/ML
1 SOLUTION/ DROPS OPHTHALMIC 2 TIMES DAILY
Qty: 6 ML | Refills: 0 | Status: SHIPPED | OUTPATIENT
Start: 2025-08-13 | End: 2025-08-18

## 2025-08-13 ASSESSMENT — ENCOUNTER SYMPTOMS
RESPIRATORY NEGATIVE: 1
EYE ITCHING: 1
EYE REDNESS: 1
EYE DISCHARGE: 0
GASTROINTESTINAL NEGATIVE: 1

## (undated) DEVICE — JELLY,LUBE,STERILE,FLIP TOP,TUBE,2-OZ: Brand: MEDLINE

## (undated) DEVICE — Z DUPLICATE USE 2527422 TUBING O2 STD 7 FT EXTN NO CRUSH VISUAL CNTRST LF

## (undated) DEVICE — CANNULA NSL AD TBNG L7FT PVC STR NONFLARED PRNG O2 DEL W STD

## (undated) DEVICE — Z DISCONTINUED USE 2424143 ADAPTER O2 SWVL CHRISTMAS TREE GRN

## (undated) DEVICE — GOWN,POLY REINFORCED,LG: Brand: MEDLINE

## (undated) DEVICE — GLOVE ORANGE PI 7   MSG9070

## (undated) DEVICE — Z INACTIVE USE 2525529 CONNECTOR TBNG FOR O2

## (undated) DEVICE — FORCEPS BX L240CM WRK CHN 2.8MM STD CAP W/ NDL MIC MESH

## (undated) DEVICE — DEFENDO AIR WATER SUCTION AND BIOPSY VALVE KIT FOR  OLYMPUS: Brand: DEFENDO AIR/WATER/SUCTION AND BIOPSY VALVE